# Patient Record
Sex: MALE | Race: WHITE | NOT HISPANIC OR LATINO | Employment: PART TIME | URBAN - METROPOLITAN AREA
[De-identification: names, ages, dates, MRNs, and addresses within clinical notes are randomized per-mention and may not be internally consistent; named-entity substitution may affect disease eponyms.]

---

## 2017-04-12 ENCOUNTER — OFFICE VISIT (OUTPATIENT)
Dept: GASTROENTEROLOGY | Facility: CLINIC | Age: 42
End: 2017-04-12

## 2017-04-12 VITALS
BODY MASS INDEX: 34.16 KG/M2 | HEIGHT: 70 IN | WEIGHT: 238.6 LBS | DIASTOLIC BLOOD PRESSURE: 76 MMHG | SYSTOLIC BLOOD PRESSURE: 128 MMHG

## 2017-04-12 DIAGNOSIS — R10.11 RIGHT UPPER QUADRANT ABDOMINAL PAIN: ICD-10-CM

## 2017-04-12 DIAGNOSIS — K62.5 RECTAL BLEEDING: ICD-10-CM

## 2017-04-12 DIAGNOSIS — D50.9 IRON DEFICIENCY ANEMIA, UNSPECIFIED IRON DEFICIENCY ANEMIA TYPE: Primary | ICD-10-CM

## 2017-04-12 DIAGNOSIS — R10.30 LOWER ABDOMINAL PAIN: ICD-10-CM

## 2017-04-12 PROCEDURE — 99203 OFFICE O/P NEW LOW 30 MIN: CPT | Performed by: INTERNAL MEDICINE

## 2017-04-12 RX ORDER — FLUTICASONE PROPIONATE 50 MCG
SPRAY, SUSPENSION (ML) NASAL
Refills: 1 | COMMUNITY
Start: 2017-03-06 | End: 2019-12-06

## 2017-04-12 RX ORDER — LISINOPRIL 10 MG/1
TABLET ORAL
Refills: 0 | COMMUNITY
Start: 2017-04-02 | End: 2023-03-03 | Stop reason: SDDI

## 2017-04-12 RX ORDER — SODIUM CHLORIDE 0.9 % (FLUSH) 0.9 %
1-10 SYRINGE (ML) INJECTION AS NEEDED
Status: CANCELLED | OUTPATIENT
Start: 2017-04-12

## 2017-04-12 RX ORDER — SUMATRIPTAN 100 MG/1
TABLET, FILM COATED ORAL
Refills: 0 | COMMUNITY
Start: 2017-03-06 | End: 2019-03-27

## 2017-04-12 NOTE — PROGRESS NOTES
"Chief Complaint   Patient presents with   • Anemia   • Rectal Bleeding   • Abdominal Pain        Emil Vo is a  41 y.o. male here for An initial visit for iron deficiency anemia, abdominal pain, rectal bleeding    HPI This 41-year-old white male patient of Dr. Hosea Manzano presents with recent history of iron deficiency anemia, rectal bleeding, and lower abdominal pain.  He recounts a history of a rectal fissure that was surgically addressed by Dr. Luke Morse in 2011.  Unfortunately, he has not had resolution of this process and notes significant bleeding on a regular basis with bowel movements.  He has used MiraLAX and stool softeners to reduce the bleeding but this has not completely resolved the problem.  He also complained of recent lower abdominal pain actually suprapubic in location with a sensation of being \"racked up\".  He did describe some blood in his semen and is scheduled to see a urologist in the near future.  He has been treated for this in the past with antibiotics and has responded appropriately.  He denies any reflux symptoms, has had intermittent right upper quadrant discomfort that was not associated with diet or bowel function.  This has subsided in the recent past.  Family history is negative for colorectal cancer.  He denies aspirin or NSAID use.  Weight has been stable.  Recent lab work reflects both iron deficiency and anemic.    Past Medical History:   Diagnosis Date   • Seasonal allergies        Current Outpatient Prescriptions   Medication Sig Dispense Refill   • BusPIRone HCl (BUSPAR PO) Take  by mouth.     • ciprofloxacin (CIPRO) 500 MG tablet Take 1 tablet by mouth 2 (two) times a day. 14 tablet 0   • DiphenhydrAMINE HCl (BENADRYL ALLERGY PO) Take  by mouth.     • fluticasone (FLONASE) 50 MCG/ACT nasal spray   1   • lisinopril (PRINIVIL,ZESTRIL) 10 MG tablet   0   • SUMAtriptan (IMITREX) 100 MG tablet   0   • Cyclobenzaprine HCl (FLEXERIL PO) Take  by mouth.       No current " facility-administered medications for this visit.        PRN Meds:.    Allergies   Allergen Reactions   • Penicillins        Social History     Social History   • Marital status: Single     Spouse name: N/A   • Number of children: N/A   • Years of education: N/A     Occupational History   • Not on file.     Social History Main Topics   • Smoking status: Never Smoker   • Smokeless tobacco: Not on file   • Alcohol use No   • Drug use: No   • Sexual activity: Not on file     Other Topics Concern   • Not on file     Social History Narrative       History reviewed. No pertinent family history.    Review of Systems   Constitutional: Negative for activity change, appetite change, fatigue and unexpected weight change.   HENT: Negative for congestion, facial swelling, sore throat, trouble swallowing and voice change.    Eyes: Negative for photophobia and visual disturbance.   Respiratory: Negative for cough and choking.    Cardiovascular: Negative for chest pain.   Gastrointestinal: Positive for anal bleeding. Negative for abdominal distention, abdominal pain, blood in stool, constipation, diarrhea, nausea, rectal pain and vomiting.   Endocrine: Negative for polyphagia.   Musculoskeletal: Negative for arthralgias, gait problem and joint swelling.   Skin: Negative for color change, pallor and rash.   Allergic/Immunologic: Negative for food allergies.   Neurological: Negative for speech difficulty and headaches.   Hematological: Does not bruise/bleed easily.   Psychiatric/Behavioral: Negative for agitation, confusion and sleep disturbance.       Vitals:    04/12/17 0814   BP: 128/76       Physical Exam   Constitutional: He is oriented to person, place, and time. He appears well-developed and well-nourished. No distress.   HENT:   Head: Normocephalic.   Mouth/Throat: Oropharynx is clear and moist. No oropharyngeal exudate.   Eyes: Conjunctivae and EOM are normal. No scleral icterus.   Neck: Normal range of motion. No  thyromegaly present.   Cardiovascular: Normal rate and regular rhythm.    No murmur heard.  Pulmonary/Chest: Breath sounds normal. No respiratory distress. He has no wheezes. He has no rales.   Abdominal: Soft. Bowel sounds are normal. He exhibits no distension and no mass. There is no hepatosplenomegaly. There is no tenderness.   Genitourinary:   Genitourinary Comments: Deferred examination until endoscopy   Musculoskeletal: Normal range of motion. He exhibits no edema or tenderness.   Lymphadenopathy:     He has no cervical adenopathy.   Neurological: He is alert and oriented to person, place, and time.   Skin: Skin is warm and dry. No rash noted. He is not diaphoretic. No erythema.   Psychiatric: He has a normal mood and affect. His behavior is normal.   Vitals reviewed.      ASSESSMENT  #1 iron deficiency anemia: May be associated with episodes of rectal bleeding cannot rule out other insidious blood loss.  #2 rectal bleeding: History of fissure surgery.  #3 lower abdominal pain: Seems to be associated with gynecologic source, urologic evaluation pending.      PLAN  Schedule EGD and colonoscopy      ICD-10-CM ICD-9-CM   1. Iron deficiency anemia, unspecified iron deficiency anemia type D50.9 280.9   2. Lower abdominal pain R10.30 789.09   3. Rectal bleeding K62.5 569.3

## 2017-05-10 ENCOUNTER — ANESTHESIA EVENT (OUTPATIENT)
Dept: GASTROENTEROLOGY | Facility: HOSPITAL | Age: 42
End: 2017-05-10

## 2017-05-10 ENCOUNTER — ANESTHESIA (OUTPATIENT)
Dept: GASTROENTEROLOGY | Facility: HOSPITAL | Age: 42
End: 2017-05-10

## 2017-05-10 ENCOUNTER — HOSPITAL ENCOUNTER (OUTPATIENT)
Facility: HOSPITAL | Age: 42
Setting detail: HOSPITAL OUTPATIENT SURGERY
Discharge: HOME | End: 2017-05-10
Attending: INTERNAL MEDICINE | Admitting: INTERNAL MEDICINE

## 2017-05-10 VITALS
SYSTOLIC BLOOD PRESSURE: 116 MMHG | RESPIRATION RATE: 16 BRPM | HEIGHT: 71 IN | BODY MASS INDEX: 32.68 KG/M2 | WEIGHT: 233.4 LBS | HEART RATE: 72 BPM | TEMPERATURE: 98.1 F | DIASTOLIC BLOOD PRESSURE: 77 MMHG | OXYGEN SATURATION: 97 %

## 2017-05-10 DIAGNOSIS — R10.11 RIGHT UPPER QUADRANT ABDOMINAL PAIN: ICD-10-CM

## 2017-05-10 DIAGNOSIS — R10.30 LOWER ABDOMINAL PAIN: ICD-10-CM

## 2017-05-10 DIAGNOSIS — K62.5 RECTAL BLEEDING: ICD-10-CM

## 2017-05-10 DIAGNOSIS — D50.9 IRON DEFICIENCY ANEMIA, UNSPECIFIED IRON DEFICIENCY ANEMIA TYPE: ICD-10-CM

## 2017-05-10 PROCEDURE — 87081 CULTURE SCREEN ONLY: CPT | Performed by: INTERNAL MEDICINE

## 2017-05-10 PROCEDURE — S0260 H&P FOR SURGERY: HCPCS | Performed by: INTERNAL MEDICINE

## 2017-05-10 PROCEDURE — 43239 EGD BIOPSY SINGLE/MULTIPLE: CPT | Performed by: INTERNAL MEDICINE

## 2017-05-10 PROCEDURE — 45378 DIAGNOSTIC COLONOSCOPY: CPT | Performed by: INTERNAL MEDICINE

## 2017-05-10 PROCEDURE — 88312 SPECIAL STAINS GROUP 1: CPT | Performed by: INTERNAL MEDICINE

## 2017-05-10 PROCEDURE — 25010000002 DIPHENHYDRAMINE PER 50 MG: Performed by: ANESTHESIOLOGY

## 2017-05-10 PROCEDURE — 25010000002 PROPOFOL 10 MG/ML EMULSION: Performed by: ANESTHESIOLOGY

## 2017-05-10 PROCEDURE — 88305 TISSUE EXAM BY PATHOLOGIST: CPT | Performed by: INTERNAL MEDICINE

## 2017-05-10 RX ORDER — LIDOCAINE HYDROCHLORIDE 20 MG/ML
INJECTION, SOLUTION INFILTRATION; PERINEURAL AS NEEDED
Status: DISCONTINUED | OUTPATIENT
Start: 2017-05-10 | End: 2017-05-10 | Stop reason: SURG

## 2017-05-10 RX ORDER — PROPOFOL 10 MG/ML
VIAL (ML) INTRAVENOUS AS NEEDED
Status: DISCONTINUED | OUTPATIENT
Start: 2017-05-10 | End: 2017-05-10 | Stop reason: SURG

## 2017-05-10 RX ORDER — SODIUM CHLORIDE, SODIUM LACTATE, POTASSIUM CHLORIDE, CALCIUM CHLORIDE 600; 310; 30; 20 MG/100ML; MG/100ML; MG/100ML; MG/100ML
20 INJECTION, SOLUTION INTRAVENOUS CONTINUOUS
Status: DISCONTINUED | OUTPATIENT
Start: 2017-05-10 | End: 2017-05-10 | Stop reason: HOSPADM

## 2017-05-10 RX ORDER — ONDANSETRON 2 MG/ML
4 INJECTION INTRAMUSCULAR; INTRAVENOUS ONCE AS NEEDED
Status: DISCONTINUED | OUTPATIENT
Start: 2017-05-10 | End: 2017-05-10 | Stop reason: HOSPADM

## 2017-05-10 RX ORDER — SODIUM CHLORIDE 0.9 % (FLUSH) 0.9 %
1-10 SYRINGE (ML) INJECTION AS NEEDED
Status: DISCONTINUED | OUTPATIENT
Start: 2017-05-10 | End: 2017-05-10 | Stop reason: HOSPADM

## 2017-05-10 RX ORDER — HYDROCODONE BITARTRATE AND ACETAMINOPHEN 10; 325 MG/1; MG/1
1 TABLET ORAL EVERY 6 HOURS PRN
COMMUNITY
End: 2019-03-27

## 2017-05-10 RX ORDER — DIPHENHYDRAMINE HYDROCHLORIDE 50 MG/ML
INJECTION INTRAMUSCULAR; INTRAVENOUS AS NEEDED
Status: DISCONTINUED | OUTPATIENT
Start: 2017-05-10 | End: 2017-05-10 | Stop reason: SURG

## 2017-05-10 RX ADMIN — SODIUM CHLORIDE, POTASSIUM CHLORIDE, SODIUM LACTATE AND CALCIUM CHLORIDE 20 ML/HR: 600; 310; 30; 20 INJECTION, SOLUTION INTRAVENOUS at 14:15

## 2017-05-10 RX ADMIN — DIPHENHYDRAMINE HYDROCHLORIDE 25 MG: 50 INJECTION INTRAMUSCULAR; INTRAVENOUS at 14:53

## 2017-05-10 RX ADMIN — PROPOFOL 240 MG: 10 INJECTION, EMULSION INTRAVENOUS at 15:00

## 2017-05-10 RX ADMIN — PROPOFOL 200 MG: 10 INJECTION, EMULSION INTRAVENOUS at 14:55

## 2017-05-10 RX ADMIN — PROPOFOL 560 MG: 10 INJECTION, EMULSION INTRAVENOUS at 14:53

## 2017-05-10 RX ADMIN — LIDOCAINE HYDROCHLORIDE 100 MG: 20 INJECTION, SOLUTION INFILTRATION; PERINEURAL at 14:40

## 2017-05-11 LAB — UREASE TISS QL: NEGATIVE

## 2017-05-12 LAB
CYTO UR: NORMAL
LAB AP CASE REPORT: NORMAL
Lab: NORMAL
PATH REPORT.FINAL DX SPEC: NORMAL
PATH REPORT.GROSS SPEC: NORMAL

## 2017-05-25 ENCOUNTER — TELEPHONE (OUTPATIENT)
Dept: GASTROENTEROLOGY | Facility: CLINIC | Age: 42
End: 2017-05-25

## 2017-07-31 ENCOUNTER — HOSPITAL ENCOUNTER (OUTPATIENT)
Dept: GENERAL RADIOLOGY | Facility: HOSPITAL | Age: 42
Discharge: HOME | End: 2017-07-31
Attending: FAMILY MEDICINE | Admitting: FAMILY MEDICINE

## 2017-07-31 DIAGNOSIS — M25.569 KNEE PAIN: ICD-10-CM

## 2017-07-31 PROCEDURE — 73560 X-RAY EXAM OF KNEE 1 OR 2: CPT

## 2018-02-06 ENCOUNTER — HOSPITAL ENCOUNTER (EMERGENCY)
Facility: HOSPITAL | Age: 43
Discharge: HOME | End: 2018-02-06
Attending: EMERGENCY MEDICINE | Admitting: EMERGENCY MEDICINE

## 2018-02-06 VITALS
TEMPERATURE: 98.1 F | BODY MASS INDEX: 34.36 KG/M2 | WEIGHT: 240 LBS | DIASTOLIC BLOOD PRESSURE: 91 MMHG | SYSTOLIC BLOOD PRESSURE: 137 MMHG | HEIGHT: 70 IN | RESPIRATION RATE: 16 BRPM | HEART RATE: 71 BPM | OXYGEN SATURATION: 100 %

## 2018-02-06 DIAGNOSIS — Z79.899 POLYPHARMACY: Primary | ICD-10-CM

## 2018-02-06 DIAGNOSIS — S80.10XA CONTUSION OF LOWER EXTREMITY, UNSPECIFIED LATERALITY, INITIAL ENCOUNTER: ICD-10-CM

## 2018-02-06 DIAGNOSIS — R27.0 ATAXIA: ICD-10-CM

## 2018-02-06 PROCEDURE — 99283 EMERGENCY DEPT VISIT LOW MDM: CPT

## 2018-02-06 NOTE — ED NOTES
Yesterday pt reports he took 2mg Lorazepam in the morning. 3 hrs later he took 800mg Gabapentin and another 2mg Lorazepam during the day. Benadryl taken at night before bed. Denies any medications today. Pt states his girlfriend called EMS d/t falls and confusion.  Pt complains of pain on legs d/t falls. Scattered contusions throughout both legs and knees.     Teri Evangelista RN  02/06/18 [id number]

## 2018-02-06 NOTE — ED PROVIDER NOTES
EMERGENCY DEPARTMENT ENCOUNTER    CHIEF COMPLAINT  Chief Complaint: falling and confusion  History given by: Patient  History limited by: N/A  Room Number: 48/48  PMD: Truman Manzano MD      HPI:  Pt is a 42 y.o. male who presents via EMS complaining of falling and confusion onset yesterday.  Pt reports his girlfriend called the ambulance after the pt fell multiple times upon standing from bed.  Pt reports that his falls caused leg pain. Pt is concerned by the medications he took yesterday (2mg Adavan x2 and Gabapentin and Benadryl).   Pt denies smoking, drinking, and drug use.      Duration/Onset/Timing: several hours after taking multiple medicines  Location: neuro  Radiation: none  Quality: confusion  Intensity/Severity: moderate  Associated Symptoms: leg pain  Aggravating or Alleviating Factors: standing  Previous Episodes: none      PAST MEDICAL HISTORY  Active Ambulatory Problems     Diagnosis Date Noted   • No Active Ambulatory Problems     Resolved Ambulatory Problems     Diagnosis Date Noted   • No Resolved Ambulatory Problems     Past Medical History:   Diagnosis Date   • Anxiety    • Arthritis    • Blood in semen    • Colon polyp    • Hypertension    • Seasonal allergies        PAST SURGICAL HISTORY  Past Surgical History:   Procedure Laterality Date   • ANAL FISSURECTOMY     • COLONOSCOPY N/A 5/10/2017    Procedure: COLONOSCOPY TO CECUM & T.I. ;  Surgeon: Truman FRIEND MD;  Location: [facility] ENDOSCOPY;  Service:    • ENDOSCOPY  5/10/2017    Procedure: ESOPHAGOGASTRODUODENOSCOPY WITH COLD BIOPSIES;  Surgeon: Truman FRIEND MD;  Location: [facility] ENDOSCOPY;  Service:        FAMILY HISTORY  History reviewed. No pertinent family history.    SOCIAL HISTORY  Social History     Social History   • Marital status: Single     Spouse name: N/A   • Number of children: N/A   • Years of education: N/A     Occupational History   • Not on file.     Social History Main Topics   • Smoking status: Never  Smoker   • Smokeless tobacco: Not on file   • Alcohol use No   • Drug use: No   • Sexual activity: Not on file     Other Topics Concern   • Not on file     Social History Narrative   • No narrative on file       ALLERGIES  Penicillins    REVIEW OF SYSTEMS  Review of Systems   Constitutional: Negative for fever.   Respiratory: Negative for shortness of breath.    Cardiovascular: Negative for chest pain.   Musculoskeletal: Positive for arthralgias (leg pain from falling).       PHYSICAL EXAM  ED Triage Vitals   Temp Heart Rate Resp BP SpO2   02/06/18 0700 02/06/18 0656 02/06/18 0656 02/06/18 0656 02/06/18 0656   98.1 °F (36.7 °C) 78 16 143/84 97 %      Temp src Heart Rate Source Patient Position BP Location FiO2 (%)   02/06/18 0700 -- -- -- --   Tympanic           Physical Exam   Constitutional: No distress.   HENT:   Head: Normocephalic and atraumatic.   Eyes: EOM are normal.   Neck: Normal range of motion.   Cardiovascular: Normal heart sounds.    Pulmonary/Chest: No respiratory distress.   Abdominal: There is no tenderness.   Musculoskeletal: He exhibits no edema.   Neurological: He is alert. Gait normal.   Skin: Skin is warm and dry.   Multiple contusions and abrasions to bilateral shins   Nursing note and vitals reviewed.        PROCEDURES  Procedures      PROGRESS AND CONSULTS  ED Course       1134  Discussed with pt his sx are caused by the combination of medications he took yesterday.  Discussed with pt plan of discharge and advised the pt to rest and avoid mixing those medications in the future.  Pt understands and agrees with the plan, all questions answered.      MEDICAL DECISION MAKING  Results were reviewed/discussed with the patient and they were also made aware of online access. Pt also made aware that some labs, such as cultures, will not be resulted during ER visit and follow up with PMD is necessary.     MDM  Number of Diagnoses or Management Options  Ataxia:   Contusion of lower extremity,  unspecified laterality, initial encounter:   Polypharmacy:   Patient Progress  Patient progress: stable         DIAGNOSIS  Final diagnoses:   Polypharmacy   Ataxia   Contusion of lower extremity, unspecified laterality, initial encounter       DISPOSITION  DISCHARGE    Patient discharged in stable condition.    Reviewed implications of results, diagnosis, meds, responsibility to follow up, warning signs and symptoms of possible worsening, potential complications and reasons to return to ER.    Patient/Family voiced understanding of above instructions.    Discussed plan for discharge, as there is no emergent indication for admission.  Pt/family is agreeable and understands need for follow up and repeat testing.  Pt is aware that discharge does not mean that nothing is wrong but it indicates no emergency is present that requires admission and they must continue care with follow-up as given below or physician of their choice.     FOLLOW-UP  Truman Manzano MD  [address] [facility] [address] [phone]      Call for Appointment         Medication List      Stop          ciprofloxacin 500 MG tablet   Commonly known as:  CIPRO       fluticasone 50 MCG/ACT nasal spray   Commonly known as:  FLONASE       HYDROcodone-acetaminophen  MG per tablet   Commonly known as:  NORCO               Latest Documented Vital Signs:  As of 11:35 AM  BP- 137/91 HR- 71 Temp- 98.1 °F (36.7 °C) (Tympanic) O2 sat- 100%    --  Documentation assistance provided by jj Bond for Dr. Wagner.  Information recorded by the scribe was done at my direction and has been verified and validated by me.     Isela Bond  02/06/18 8445       Pk Wagner MD  02/06/18 1130

## 2018-02-06 NOTE — ED TRIAGE NOTES
"EMS dispatched for insomnia and lack of coordination.  Pt reports he's had insomnia for the last 6-7hrs, when he does fall asleep pt states, \"i fall asleep and then wake up and run into things.\"  Pt also informed this RN he has taken prescribed lorazepam 1mg earlier in the day, then gabapentin 800mg early this morning.  Pt awake and alert upon assessment.  "

## 2018-06-25 ENCOUNTER — TRANSCRIBE ORDERS (OUTPATIENT)
Dept: ADMINISTRATIVE | Facility: HOSPITAL | Age: 43
End: 2018-06-25

## 2018-06-25 DIAGNOSIS — R63.4 WEIGHT LOSS: ICD-10-CM

## 2018-06-25 DIAGNOSIS — R53.83 FATIGUE, UNSPECIFIED TYPE: Primary | ICD-10-CM

## 2018-06-25 DIAGNOSIS — R10.9 ABDOMINAL PAIN, UNSPECIFIED ABDOMINAL LOCATION: ICD-10-CM

## 2018-06-25 DIAGNOSIS — R63.4 WEIGHT LOSS: Primary | ICD-10-CM

## 2021-06-23 PROCEDURE — 99213 OFFICE O/P EST LOW 20 MIN: CPT | Performed by: PHYSICIAN ASSISTANT

## 2021-08-26 PROCEDURE — 99213 OFFICE O/P EST LOW 20 MIN: CPT | Performed by: PHYSICIAN ASSISTANT

## 2021-11-01 PROCEDURE — 99213 OFFICE O/P EST LOW 20 MIN: CPT | Performed by: PHYSICIAN ASSISTANT

## 2023-02-17 ENCOUNTER — APPOINTMENT (OUTPATIENT)
Dept: ULTRASOUND IMAGING | Facility: HOSPITAL | Age: 48
End: 2023-02-17
Payer: COMMERCIAL

## 2023-02-17 ENCOUNTER — HOSPITAL ENCOUNTER (OUTPATIENT)
Facility: HOSPITAL | Age: 48
Discharge: HOME | End: 2023-02-19
Attending: EMERGENCY MEDICINE | Admitting: HOSPITALIST
Payer: COMMERCIAL

## 2023-02-17 DIAGNOSIS — K80.10 CALCULUS OF GALLBLADDER WITH CHRONIC CHOLECYSTITIS WITHOUT OBSTRUCTION: ICD-10-CM

## 2023-02-17 DIAGNOSIS — D50.9 MICROCYTIC ANEMIA: ICD-10-CM

## 2023-02-17 DIAGNOSIS — K81.9 CHOLECYSTITIS: Primary | ICD-10-CM

## 2023-02-17 PROBLEM — G43.909 MIGRAINE: Status: ACTIVE | Noted: 2023-02-17

## 2023-02-17 PROBLEM — I10 ESSENTIAL HYPERTENSION: Status: ACTIVE | Noted: 2023-02-17

## 2023-02-17 PROBLEM — J30.9 ALLERGIC RHINITIS: Status: ACTIVE | Noted: 2023-02-17

## 2023-02-17 PROBLEM — K80.20 CHOLELITHIASIS: Status: ACTIVE | Noted: 2023-02-17

## 2023-02-17 PROBLEM — D64.9 ANEMIA: Status: ACTIVE | Noted: 2023-02-17

## 2023-02-17 PROBLEM — F41.1 GENERALIZED ANXIETY DISORDER: Status: ACTIVE | Noted: 2023-02-17

## 2023-02-17 LAB
ALBUMIN SERPL-MCNC: 4.7 G/DL (ref 3.5–5.2)
ALBUMIN/GLOB SERPL: 1.7 G/DL
ALP SERPL-CCNC: 110 U/L (ref 39–117)
ALT SERPL W P-5'-P-CCNC: 16 U/L (ref 1–41)
ANION GAP SERPL CALCULATED.3IONS-SCNC: 9 MMOL/L (ref 5–15)
ANISOCYTOSIS BLD QL: ABNORMAL
AST SERPL-CCNC: 15 U/L (ref 1–40)
BASOPHILS # BLD MANUAL: 0.12 10*3/MM3 (ref 0–0.2)
BASOPHILS NFR BLD MANUAL: 2 % (ref 0–1.5)
BILIRUB SERPL-MCNC: 0.4 MG/DL (ref 0–1.2)
BILIRUB UR QL STRIP: NEGATIVE
BUN SERPL-MCNC: 14 MG/DL (ref 6–20)
BUN/CREAT SERPL: 14.3 (ref 7–25)
CALCIUM SPEC-SCNC: 9.3 MG/DL (ref 8.6–10.5)
CHLORIDE SERPL-SCNC: 102 MMOL/L (ref 98–107)
CLARITY UR: CLEAR
CO2 SERPL-SCNC: 27 MMOL/L (ref 22–29)
COLOR UR: ABNORMAL
CREAT SERPL-MCNC: 0.98 MG/DL (ref 0.76–1.27)
D-LACTATE SERPL-SCNC: 1 MMOL/L (ref 0.5–2)
DEPRECATED RDW RBC AUTO: 36.7 FL (ref 37–54)
EGFRCR SERPLBLD CKD-EPI 2021: 96.3 ML/MIN/1.73
EOSINOPHIL # BLD MANUAL: 0.06 10*3/MM3 (ref 0–0.4)
EOSINOPHIL NFR BLD MANUAL: 1 % (ref 0.3–6.2)
ERYTHROCYTE [DISTWIDTH] IN BLOOD BY AUTOMATED COUNT: 16.6 % (ref 12.3–15.4)
GLOBULIN UR ELPH-MCNC: 2.8 GM/DL
GLUCOSE SERPL-MCNC: 117 MG/DL (ref 65–99)
GLUCOSE UR STRIP-MCNC: NEGATIVE MG/DL
HCT VFR BLD AUTO: 32.4 % (ref 37.5–51)
HGB BLD-MCNC: 9.3 G/DL (ref 13–17.7)
HGB UR QL STRIP.AUTO: NEGATIVE
HOLD SPECIMEN: NORMAL
HOLD SPECIMEN: NORMAL
HYPOCHROMIA BLD QL: ABNORMAL
KETONES UR QL STRIP: ABNORMAL
LEUKOCYTE ESTERASE UR QL STRIP.AUTO: NEGATIVE
LIPASE SERPL-CCNC: 7 U/L (ref 13–60)
LYMPHOCYTES # BLD MANUAL: 0.56 10*3/MM3 (ref 0.7–3.1)
LYMPHOCYTES NFR BLD MANUAL: 5.1 % (ref 5–12)
MCH RBC QN AUTO: 18.1 PG (ref 26.6–33)
MCHC RBC AUTO-ENTMCNC: 28.7 G/DL (ref 31.5–35.7)
MCV RBC AUTO: 63.2 FL (ref 79–97)
MICROCYTES BLD QL: ABNORMAL
MONOCYTES # BLD: 0.31 10*3/MM3 (ref 0.1–0.9)
NEUTROPHILS # BLD AUTO: 5.08 10*3/MM3 (ref 1.7–7)
NEUTROPHILS NFR BLD MANUAL: 82.8 % (ref 42.7–76)
NITRITE UR QL STRIP: NEGATIVE
NRBC BLD AUTO-RTO: 0 /100 WBC (ref 0–0.2)
OVALOCYTES BLD QL SMEAR: ABNORMAL
PH UR STRIP.AUTO: 5.5 [PH] (ref 5–8)
PLAT MORPH BLD: NORMAL
PLATELET # BLD AUTO: 319 10*3/MM3 (ref 140–450)
PMV BLD AUTO: 9.8 FL (ref 6–12)
POIKILOCYTOSIS BLD QL SMEAR: ABNORMAL
POLYCHROMASIA BLD QL SMEAR: ABNORMAL
POTASSIUM SERPL-SCNC: 3.5 MMOL/L (ref 3.5–5.2)
PROT SERPL-MCNC: 7.5 G/DL (ref 6–8.5)
PROT UR QL STRIP: ABNORMAL
RBC # BLD AUTO: 5.13 10*6/MM3 (ref 4.14–5.8)
SODIUM SERPL-SCNC: 138 MMOL/L (ref 136–145)
SP GR UR STRIP: >=1.03 (ref 1–1.03)
UROBILINOGEN UR QL STRIP: ABNORMAL
VARIANT LYMPHS NFR BLD MANUAL: 9.1 % (ref 19.6–45.3)
WBC MORPH BLD: NORMAL
WBC NRBC COR # BLD: 6.13 10*3/MM3 (ref 3.4–10.8)
WHOLE BLOOD HOLD COAG: NORMAL
WHOLE BLOOD HOLD SPECIMEN: NORMAL

## 2023-02-17 PROCEDURE — 87040 BLOOD CULTURE FOR BACTERIA: CPT | Performed by: EMERGENCY MEDICINE

## 2023-02-17 PROCEDURE — 84466 ASSAY OF TRANSFERRIN: CPT | Performed by: INTERNAL MEDICINE

## 2023-02-17 PROCEDURE — 36415 COLL VENOUS BLD VENIPUNCTURE: CPT

## 2023-02-17 PROCEDURE — 76705 ECHO EXAM OF ABDOMEN: CPT

## 2023-02-17 PROCEDURE — G0378 HOSPITAL OBSERVATION PER HR: HCPCS

## 2023-02-17 PROCEDURE — 25010000002 CEFTRIAXONE PER 250 MG: Performed by: EMERGENCY MEDICINE

## 2023-02-17 PROCEDURE — 83010 ASSAY OF HAPTOGLOBIN QUANT: CPT | Performed by: INTERNAL MEDICINE

## 2023-02-17 PROCEDURE — 80053 COMPREHEN METABOLIC PANEL: CPT | Performed by: EMERGENCY MEDICINE

## 2023-02-17 PROCEDURE — 85025 COMPLETE CBC W/AUTO DIFF WBC: CPT

## 2023-02-17 PROCEDURE — 93005 ELECTROCARDIOGRAM TRACING: CPT | Performed by: INTERNAL MEDICINE

## 2023-02-17 PROCEDURE — 81003 URINALYSIS AUTO W/O SCOPE: CPT | Performed by: EMERGENCY MEDICINE

## 2023-02-17 PROCEDURE — 83540 ASSAY OF IRON: CPT | Performed by: INTERNAL MEDICINE

## 2023-02-17 PROCEDURE — 82607 VITAMIN B-12: CPT | Performed by: INTERNAL MEDICINE

## 2023-02-17 PROCEDURE — 83690 ASSAY OF LIPASE: CPT | Performed by: EMERGENCY MEDICINE

## 2023-02-17 PROCEDURE — 99284 EMERGENCY DEPT VISIT MOD MDM: CPT

## 2023-02-17 PROCEDURE — 83615 LACTATE (LD) (LDH) ENZYME: CPT | Performed by: INTERNAL MEDICINE

## 2023-02-17 PROCEDURE — 83605 ASSAY OF LACTIC ACID: CPT | Performed by: EMERGENCY MEDICINE

## 2023-02-17 PROCEDURE — 93010 ELECTROCARDIOGRAM REPORT: CPT | Performed by: INTERNAL MEDICINE

## 2023-02-17 PROCEDURE — 82746 ASSAY OF FOLIC ACID SERUM: CPT | Performed by: INTERNAL MEDICINE

## 2023-02-17 PROCEDURE — 85007 BL SMEAR W/DIFF WBC COUNT: CPT | Performed by: EMERGENCY MEDICINE

## 2023-02-17 PROCEDURE — 82728 ASSAY OF FERRITIN: CPT | Performed by: INTERNAL MEDICINE

## 2023-02-17 RX ORDER — FLUTICASONE PROPIONATE 50 MCG
2 SPRAY, SUSPENSION (ML) NASAL DAILY
Status: DISCONTINUED | OUTPATIENT
Start: 2023-02-18 | End: 2023-02-19 | Stop reason: HOSPADM

## 2023-02-17 RX ORDER — CHOLECALCIFEROL (VITAMIN D3) 125 MCG
5 CAPSULE ORAL NIGHTLY PRN
Status: DISCONTINUED | OUTPATIENT
Start: 2023-02-17 | End: 2023-02-19 | Stop reason: HOSPADM

## 2023-02-17 RX ORDER — LISINOPRIL 10 MG/1
10 TABLET ORAL
Status: DISCONTINUED | OUTPATIENT
Start: 2023-02-18 | End: 2023-02-19 | Stop reason: HOSPADM

## 2023-02-17 RX ORDER — SUMATRIPTAN 50 MG/1
50 TABLET, FILM COATED ORAL
Status: DISCONTINUED | OUTPATIENT
Start: 2023-02-17 | End: 2023-02-19 | Stop reason: HOSPADM

## 2023-02-17 RX ORDER — ACETAMINOPHEN 160 MG/5ML
650 SOLUTION ORAL EVERY 4 HOURS PRN
Status: DISCONTINUED | OUTPATIENT
Start: 2023-02-17 | End: 2023-02-19 | Stop reason: HOSPADM

## 2023-02-17 RX ORDER — SODIUM CHLORIDE 9 MG/ML
40 INJECTION, SOLUTION INTRAVENOUS AS NEEDED
Status: DISCONTINUED | OUTPATIENT
Start: 2023-02-17 | End: 2023-02-19 | Stop reason: HOSPADM

## 2023-02-17 RX ORDER — ACETAMINOPHEN 650 MG/1
650 SUPPOSITORY RECTAL EVERY 4 HOURS PRN
Status: DISCONTINUED | OUTPATIENT
Start: 2023-02-17 | End: 2023-02-19 | Stop reason: HOSPADM

## 2023-02-17 RX ORDER — ACETAMINOPHEN 325 MG/1
650 TABLET ORAL EVERY 4 HOURS PRN
Status: DISCONTINUED | OUTPATIENT
Start: 2023-02-17 | End: 2023-02-19 | Stop reason: HOSPADM

## 2023-02-17 RX ORDER — SODIUM CHLORIDE 0.9 % (FLUSH) 0.9 %
10 SYRINGE (ML) INJECTION EVERY 12 HOURS SCHEDULED
Status: DISCONTINUED | OUTPATIENT
Start: 2023-02-17 | End: 2023-02-19 | Stop reason: HOSPADM

## 2023-02-17 RX ORDER — SODIUM CHLORIDE 9 MG/ML
100 INJECTION, SOLUTION INTRAVENOUS CONTINUOUS
Status: DISCONTINUED | OUTPATIENT
Start: 2023-02-17 | End: 2023-02-19 | Stop reason: HOSPADM

## 2023-02-17 RX ORDER — ONDANSETRON 2 MG/ML
4 INJECTION INTRAMUSCULAR; INTRAVENOUS EVERY 6 HOURS PRN
Status: DISCONTINUED | OUTPATIENT
Start: 2023-02-17 | End: 2023-02-19 | Stop reason: HOSPADM

## 2023-02-17 RX ORDER — CETIRIZINE HYDROCHLORIDE 10 MG/1
10 TABLET ORAL DAILY
Status: DISCONTINUED | OUTPATIENT
Start: 2023-02-18 | End: 2023-02-19 | Stop reason: HOSPADM

## 2023-02-17 RX ORDER — BISACODYL 10 MG
10 SUPPOSITORY, RECTAL RECTAL DAILY PRN
Status: DISCONTINUED | OUTPATIENT
Start: 2023-02-17 | End: 2023-02-19 | Stop reason: HOSPADM

## 2023-02-17 RX ORDER — POLYETHYLENE GLYCOL 3350 17 G/17G
17 POWDER, FOR SOLUTION ORAL DAILY PRN
Status: DISCONTINUED | OUTPATIENT
Start: 2023-02-17 | End: 2023-02-19 | Stop reason: HOSPADM

## 2023-02-17 RX ORDER — LEVOFLOXACIN 5 MG/ML
750 INJECTION, SOLUTION INTRAVENOUS EVERY 24 HOURS
Status: DISCONTINUED | OUTPATIENT
Start: 2023-02-18 | End: 2023-02-19 | Stop reason: HOSPADM

## 2023-02-17 RX ORDER — SODIUM CHLORIDE 0.9 % (FLUSH) 0.9 %
10 SYRINGE (ML) INJECTION AS NEEDED
Status: DISCONTINUED | OUTPATIENT
Start: 2023-02-17 | End: 2023-02-19 | Stop reason: HOSPADM

## 2023-02-17 RX ORDER — NALOXONE HCL 0.4 MG/ML
0.4 VIAL (ML) INJECTION
Status: DISCONTINUED | OUTPATIENT
Start: 2023-02-17 | End: 2023-02-19 | Stop reason: HOSPADM

## 2023-02-17 RX ORDER — METRONIDAZOLE 500 MG/100ML
500 INJECTION, SOLUTION INTRAVENOUS EVERY 8 HOURS
Status: DISCONTINUED | OUTPATIENT
Start: 2023-02-18 | End: 2023-02-19 | Stop reason: HOSPADM

## 2023-02-17 RX ORDER — METRONIDAZOLE 500 MG/100ML
500 INJECTION, SOLUTION INTRAVENOUS ONCE
Status: COMPLETED | OUTPATIENT
Start: 2023-02-17 | End: 2023-02-17

## 2023-02-17 RX ORDER — HYDROCODONE BITARTRATE AND ACETAMINOPHEN 7.5; 325 MG/1; MG/1
1 TABLET ORAL EVERY 4 HOURS PRN
Status: DISCONTINUED | OUTPATIENT
Start: 2023-02-17 | End: 2023-02-19 | Stop reason: HOSPADM

## 2023-02-17 RX ORDER — FAMOTIDINE 10 MG/ML
20 INJECTION, SOLUTION INTRAVENOUS EVERY 12 HOURS SCHEDULED
Status: DISCONTINUED | OUTPATIENT
Start: 2023-02-17 | End: 2023-02-19 | Stop reason: HOSPADM

## 2023-02-17 RX ORDER — BISACODYL 5 MG/1
5 TABLET, DELAYED RELEASE ORAL DAILY PRN
Status: DISCONTINUED | OUTPATIENT
Start: 2023-02-17 | End: 2023-02-19 | Stop reason: HOSPADM

## 2023-02-17 RX ORDER — HYDROMORPHONE HYDROCHLORIDE 1 MG/ML
0.5 INJECTION, SOLUTION INTRAMUSCULAR; INTRAVENOUS; SUBCUTANEOUS
Status: DISCONTINUED | OUTPATIENT
Start: 2023-02-17 | End: 2023-02-18

## 2023-02-17 RX ORDER — ONDANSETRON 4 MG/1
4 TABLET, FILM COATED ORAL EVERY 6 HOURS PRN
Status: DISCONTINUED | OUTPATIENT
Start: 2023-02-17 | End: 2023-02-19 | Stop reason: HOSPADM

## 2023-02-17 RX ORDER — HYDROXYZINE HYDROCHLORIDE 25 MG/1
25 TABLET, FILM COATED ORAL 2 TIMES DAILY PRN
Status: DISCONTINUED | OUTPATIENT
Start: 2023-02-17 | End: 2023-02-19 | Stop reason: HOSPADM

## 2023-02-17 RX ORDER — AMOXICILLIN 250 MG
2 CAPSULE ORAL 2 TIMES DAILY
Status: DISCONTINUED | OUTPATIENT
Start: 2023-02-17 | End: 2023-02-19 | Stop reason: HOSPADM

## 2023-02-17 RX ORDER — LORAZEPAM 0.5 MG/1
0.5 TABLET ORAL EVERY 8 HOURS PRN
Status: DISCONTINUED | OUTPATIENT
Start: 2023-02-17 | End: 2023-02-19 | Stop reason: HOSPADM

## 2023-02-17 RX ADMIN — SODIUM CHLORIDE 100 ML/HR: 9 INJECTION, SOLUTION INTRAVENOUS at 23:06

## 2023-02-17 RX ADMIN — CEFTRIAXONE SODIUM 1 G: 1 INJECTION, POWDER, FOR SOLUTION INTRAMUSCULAR; INTRAVENOUS at 23:16

## 2023-02-17 RX ADMIN — METRONIDAZOLE 500 MG: 500 INJECTION, SOLUTION INTRAVENOUS at 22:11

## 2023-02-17 RX ADMIN — ACETAMINOPHEN 650 MG: 325 TABLET, FILM COATED ORAL at 23:21

## 2023-02-17 RX ADMIN — DOCUSATE SODIUM 50MG AND SENNOSIDES 8.6MG 2 TABLET: 8.6; 5 TABLET, FILM COATED ORAL at 23:15

## 2023-02-17 NOTE — ED TRIAGE NOTES
Upper stomach pains after eating, patient states that he has nausea with this. He has felt bad for about 2 weeks now. Patient denies diarrhea.

## 2023-02-17 NOTE — ED TRIAGE NOTES
Pt reports RUQ abd pain x 2 weeks.  He has been nauseated.  No vd.  He has been unable to eat without increased pain    Patient was placed in face mask during first look triage.  Patient was wearing a face mask throughout encounter.  I wore personal protective equipment throughout the encounter.  Hand hygiene was performed before and after patient encounter.

## 2023-02-18 ENCOUNTER — ANESTHESIA EVENT (OUTPATIENT)
Dept: PERIOP | Facility: HOSPITAL | Age: 48
End: 2023-02-18
Payer: COMMERCIAL

## 2023-02-18 ENCOUNTER — ANESTHESIA (OUTPATIENT)
Dept: PERIOP | Facility: HOSPITAL | Age: 48
End: 2023-02-18
Payer: COMMERCIAL

## 2023-02-18 ENCOUNTER — ON CAMPUS - OUTPATIENT (OUTPATIENT)
Dept: URBAN - METROPOLITAN AREA HOSPITAL 114 | Facility: HOSPITAL | Age: 48
End: 2023-02-18

## 2023-02-18 ENCOUNTER — APPOINTMENT (OUTPATIENT)
Dept: GENERAL RADIOLOGY | Facility: HOSPITAL | Age: 48
End: 2023-02-18
Payer: COMMERCIAL

## 2023-02-18 DIAGNOSIS — K80.50 CALCULUS OF BILE DUCT WITHOUT CHOLANGITIS OR CHOLECYSTITIS W: ICD-10-CM

## 2023-02-18 DIAGNOSIS — R10.9 UNSPECIFIED ABDOMINAL PAIN: ICD-10-CM

## 2023-02-18 DIAGNOSIS — D50.0 IRON DEFICIENCY ANEMIA SECONDARY TO BLOOD LOSS (CHRONIC): ICD-10-CM

## 2023-02-18 LAB
ABO GROUP BLD: NORMAL
ALBUMIN SERPL-MCNC: 4 G/DL (ref 3.5–5.2)
ALBUMIN/GLOB SERPL: 1.4 G/DL
ALP SERPL-CCNC: 87 U/L (ref 39–117)
ALT SERPL W P-5'-P-CCNC: 16 U/L (ref 1–41)
ANION GAP SERPL CALCULATED.3IONS-SCNC: 10.4 MMOL/L (ref 5–15)
AST SERPL-CCNC: 13 U/L (ref 1–40)
BASOPHILS # BLD AUTO: 0.06 10*3/MM3 (ref 0–0.2)
BASOPHILS NFR BLD AUTO: 0.9 % (ref 0–1.5)
BILIRUB SERPL-MCNC: 0.4 MG/DL (ref 0–1.2)
BLD GP AB SCN SERPL QL: NEGATIVE
BUN SERPL-MCNC: 14 MG/DL (ref 6–20)
BUN/CREAT SERPL: 18.4 (ref 7–25)
CALCIUM SPEC-SCNC: 8.5 MG/DL (ref 8.6–10.5)
CHLORIDE SERPL-SCNC: 103 MMOL/L (ref 98–107)
CO2 SERPL-SCNC: 25.6 MMOL/L (ref 22–29)
CREAT SERPL-MCNC: 0.76 MG/DL (ref 0.76–1.27)
DEPRECATED RDW RBC AUTO: 38 FL (ref 37–54)
EGFRCR SERPLBLD CKD-EPI 2021: 112.3 ML/MIN/1.73
EOSINOPHIL # BLD AUTO: 0.14 10*3/MM3 (ref 0–0.4)
EOSINOPHIL NFR BLD AUTO: 2.2 % (ref 0.3–6.2)
ERYTHROCYTE [DISTWIDTH] IN BLOOD BY AUTOMATED COUNT: 16.9 % (ref 12.3–15.4)
FERRITIN SERPL-MCNC: 4.24 NG/ML (ref 30–400)
FOLATE SERPL-MCNC: 12.5 NG/ML (ref 4.78–24.2)
GLOBULIN UR ELPH-MCNC: 2.8 GM/DL
GLUCOSE SERPL-MCNC: 109 MG/DL (ref 65–99)
HAPTOGLOB SERPL-MCNC: 145 MG/DL (ref 30–200)
HBA1C MFR BLD: 5.3 % (ref 4.8–5.6)
HCT VFR BLD AUTO: 29.3 % (ref 37.5–51)
HGB BLD-MCNC: 8.2 G/DL (ref 13–17.7)
IRON 24H UR-MRATE: 18 MCG/DL (ref 59–158)
IRON SATN MFR SERPL: 3 % (ref 20–50)
LDH SERPL-CCNC: 146 U/L (ref 135–225)
LYMPHOCYTES # BLD AUTO: 2.12 10*3/MM3 (ref 0.7–3.1)
LYMPHOCYTES NFR BLD AUTO: 33.5 % (ref 19.6–45.3)
MCH RBC QN AUTO: 18.1 PG (ref 26.6–33)
MCHC RBC AUTO-ENTMCNC: 28 G/DL (ref 31.5–35.7)
MCV RBC AUTO: 64.7 FL (ref 79–97)
MONOCYTES # BLD AUTO: 0.74 10*3/MM3 (ref 0.1–0.9)
MONOCYTES NFR BLD AUTO: 11.7 % (ref 5–12)
NEUTROPHILS NFR BLD AUTO: 3.25 10*3/MM3 (ref 1.7–7)
NEUTROPHILS NFR BLD AUTO: 51.4 % (ref 42.7–76)
PLATELET # BLD AUTO: 270 10*3/MM3 (ref 140–450)
PMV BLD AUTO: 10.2 FL (ref 6–12)
POTASSIUM SERPL-SCNC: 4.1 MMOL/L (ref 3.5–5.2)
PROT SERPL-MCNC: 6.8 G/DL (ref 6–8.5)
QT INTERVAL: 426 MS
RBC # BLD AUTO: 4.53 10*6/MM3 (ref 4.14–5.8)
RETICS # AUTO: 0.04 10*6/MM3 (ref 0.02–0.13)
RETICS/RBC NFR AUTO: 0.9 % (ref 0.7–1.9)
RH BLD: POSITIVE
SODIUM SERPL-SCNC: 139 MMOL/L (ref 136–145)
T&S EXPIRATION DATE: NORMAL
TIBC SERPL-MCNC: 587 MCG/DL (ref 298–536)
TRANSFERRIN SERPL-MCNC: 394 MG/DL (ref 200–360)
VIT B12 BLD-MCNC: 403 PG/ML (ref 211–946)
WBC NRBC COR # BLD: 6.33 10*3/MM3 (ref 3.4–10.8)

## 2023-02-18 PROCEDURE — C1758 CATHETER, URETERAL: HCPCS | Performed by: SURGERY

## 2023-02-18 PROCEDURE — 85045 AUTOMATED RETICULOCYTE COUNT: CPT | Performed by: INTERNAL MEDICINE

## 2023-02-18 PROCEDURE — 25010000002 MIDAZOLAM PER 1 MG: Performed by: ANESTHESIOLOGY

## 2023-02-18 PROCEDURE — 99203 OFFICE O/P NEW LOW 30 MIN: CPT | Performed by: NURSE PRACTITIONER

## 2023-02-18 PROCEDURE — 25010000002 HYDROMORPHONE PER 4 MG: Performed by: NURSE ANESTHETIST, CERTIFIED REGISTERED

## 2023-02-18 PROCEDURE — 86900 BLOOD TYPING SEROLOGIC ABO: CPT | Performed by: ANESTHESIOLOGY

## 2023-02-18 PROCEDURE — 86901 BLOOD TYPING SEROLOGIC RH(D): CPT | Performed by: ANESTHESIOLOGY

## 2023-02-18 PROCEDURE — 96376 TX/PRO/DX INJ SAME DRUG ADON: CPT

## 2023-02-18 PROCEDURE — 88304 TISSUE EXAM BY PATHOLOGIST: CPT | Performed by: SURGERY

## 2023-02-18 PROCEDURE — G0378 HOSPITAL OBSERVATION PER HR: HCPCS

## 2023-02-18 PROCEDURE — 99222 1ST HOSP IP/OBS MODERATE 55: CPT | Performed by: INTERNAL MEDICINE

## 2023-02-18 PROCEDURE — 25010000002 NEOSTIGMINE 5 MG/10ML SOLUTION: Performed by: NURSE ANESTHETIST, CERTIFIED REGISTERED

## 2023-02-18 PROCEDURE — 47563 LAPARO CHOLECYSTECTOMY/GRAPH: CPT | Performed by: SURGERY

## 2023-02-18 PROCEDURE — 25010000002 ONDANSETRON PER 1 MG: Performed by: SURGERY

## 2023-02-18 PROCEDURE — 25010000002 HYDROMORPHONE 1 MG/ML SOLUTION: Performed by: NURSE ANESTHETIST, CERTIFIED REGISTERED

## 2023-02-18 PROCEDURE — 25010000002 FENTANYL CITRATE (PF) 50 MCG/ML SOLUTION: Performed by: NURSE ANESTHETIST, CERTIFIED REGISTERED

## 2023-02-18 PROCEDURE — 83036 HEMOGLOBIN GLYCOSYLATED A1C: CPT | Performed by: INTERNAL MEDICINE

## 2023-02-18 PROCEDURE — 74300 X-RAY BILE DUCTS/PANCREAS: CPT

## 2023-02-18 PROCEDURE — 25010000002 DEXAMETHASONE SODIUM PHOSPHATE 20 MG/5ML SOLUTION: Performed by: NURSE ANESTHETIST, CERTIFIED REGISTERED

## 2023-02-18 PROCEDURE — 99204 OFFICE O/P NEW MOD 45 MIN: CPT | Performed by: SURGERY

## 2023-02-18 PROCEDURE — 85025 COMPLETE CBC W/AUTO DIFF WBC: CPT | Performed by: INTERNAL MEDICINE

## 2023-02-18 PROCEDURE — 80053 COMPREHEN METABOLIC PANEL: CPT | Performed by: INTERNAL MEDICINE

## 2023-02-18 PROCEDURE — 96365 THER/PROPH/DIAG IV INF INIT: CPT

## 2023-02-18 PROCEDURE — 96367 TX/PROPH/DG ADDL SEQ IV INF: CPT

## 2023-02-18 PROCEDURE — 25010000002 NA FERRIC GLUC CPLX PER 12.5 MG: Performed by: SURGERY

## 2023-02-18 PROCEDURE — 25010000002 LEVOFLOXACIN PER 250 MG: Performed by: INTERNAL MEDICINE

## 2023-02-18 PROCEDURE — 86850 RBC ANTIBODY SCREEN: CPT | Performed by: ANESTHESIOLOGY

## 2023-02-18 PROCEDURE — 25010000002 PROPOFOL 10 MG/ML EMULSION: Performed by: NURSE ANESTHETIST, CERTIFIED REGISTERED

## 2023-02-18 PROCEDURE — 25010000002 ONDANSETRON PER 1 MG: Performed by: INTERNAL MEDICINE

## 2023-02-18 PROCEDURE — 83020 HEMOGLOBIN ELECTROPHORESIS: CPT | Performed by: INTERNAL MEDICINE

## 2023-02-18 PROCEDURE — 25010000002 KETOROLAC TROMETHAMINE PER 15 MG: Performed by: HOSPITALIST

## 2023-02-18 PROCEDURE — 25510000001 IOPAMIDOL 61 % SOLUTION: Performed by: SURGERY

## 2023-02-18 PROCEDURE — 25010000002 DIPHENHYDRAMINE PER 50 MG: Performed by: SURGERY

## 2023-02-18 PROCEDURE — 96375 TX/PRO/DX INJ NEW DRUG ADDON: CPT

## 2023-02-18 PROCEDURE — 25010000002 ONDANSETRON PER 1 MG: Performed by: NURSE ANESTHETIST, CERTIFIED REGISTERED

## 2023-02-18 PROCEDURE — 25010000002 HYDROMORPHONE PER 4 MG: Performed by: SURGERY

## 2023-02-18 PROCEDURE — 47563 LAPARO CHOLECYSTECTOMY/GRAPH: CPT | Performed by: SPECIALIST/TECHNOLOGIST, OTHER

## 2023-02-18 DEVICE — MEDIUM-LARGE LIGATION CLIPS 6 CLIPS/CART
Type: IMPLANTABLE DEVICE | Site: ABDOMEN | Status: FUNCTIONAL
Brand: VAS-Q-CLIP

## 2023-02-18 RX ORDER — SODIUM CHLORIDE 0.9 % (FLUSH) 0.9 %
10 SYRINGE (ML) INJECTION AS NEEDED
Status: DISCONTINUED | OUTPATIENT
Start: 2023-02-18 | End: 2023-02-18 | Stop reason: HOSPADM

## 2023-02-18 RX ORDER — FLUMAZENIL 0.1 MG/ML
0.2 INJECTION INTRAVENOUS AS NEEDED
Status: DISCONTINUED | OUTPATIENT
Start: 2023-02-18 | End: 2023-02-18 | Stop reason: HOSPADM

## 2023-02-18 RX ORDER — ROCURONIUM BROMIDE 10 MG/ML
INJECTION, SOLUTION INTRAVENOUS AS NEEDED
Status: DISCONTINUED | OUTPATIENT
Start: 2023-02-18 | End: 2023-02-18 | Stop reason: SURG

## 2023-02-18 RX ORDER — DIPHENHYDRAMINE HYDROCHLORIDE 50 MG/ML
25 INJECTION INTRAMUSCULAR; INTRAVENOUS DAILY
Status: DISCONTINUED | OUTPATIENT
Start: 2023-02-18 | End: 2023-02-19 | Stop reason: HOSPADM

## 2023-02-18 RX ORDER — BUPIVACAINE HYDROCHLORIDE AND EPINEPHRINE 2.5; 5 MG/ML; UG/ML
INJECTION, SOLUTION EPIDURAL; INFILTRATION; INTRACAUDAL; PERINEURAL AS NEEDED
Status: DISCONTINUED | OUTPATIENT
Start: 2023-02-18 | End: 2023-02-18 | Stop reason: HOSPADM

## 2023-02-18 RX ORDER — ONDANSETRON 2 MG/ML
INJECTION INTRAMUSCULAR; INTRAVENOUS AS NEEDED
Status: DISCONTINUED | OUTPATIENT
Start: 2023-02-18 | End: 2023-02-18 | Stop reason: SURG

## 2023-02-18 RX ORDER — LIDOCAINE HYDROCHLORIDE 20 MG/ML
INJECTION, SOLUTION INFILTRATION; PERINEURAL AS NEEDED
Status: DISCONTINUED | OUTPATIENT
Start: 2023-02-18 | End: 2023-02-18 | Stop reason: SURG

## 2023-02-18 RX ORDER — KETAMINE HCL IN NACL, ISO-OSM 100MG/10ML
10 SYRINGE (ML) INJECTION AS NEEDED
Status: COMPLETED | OUTPATIENT
Start: 2023-02-18 | End: 2023-02-18

## 2023-02-18 RX ORDER — PROPOFOL 10 MG/ML
VIAL (ML) INTRAVENOUS AS NEEDED
Status: DISCONTINUED | OUTPATIENT
Start: 2023-02-18 | End: 2023-02-18 | Stop reason: SURG

## 2023-02-18 RX ORDER — SODIUM CHLORIDE 9 MG/ML
40 INJECTION, SOLUTION INTRAVENOUS AS NEEDED
Status: DISCONTINUED | OUTPATIENT
Start: 2023-02-18 | End: 2023-02-18 | Stop reason: HOSPADM

## 2023-02-18 RX ORDER — DEXAMETHASONE SODIUM PHOSPHATE 4 MG/ML
INJECTION, SOLUTION INTRA-ARTICULAR; INTRALESIONAL; INTRAMUSCULAR; INTRAVENOUS; SOFT TISSUE AS NEEDED
Status: DISCONTINUED | OUTPATIENT
Start: 2023-02-18 | End: 2023-02-18 | Stop reason: SURG

## 2023-02-18 RX ORDER — OXYCODONE AND ACETAMINOPHEN 7.5; 325 MG/1; MG/1
1 TABLET ORAL EVERY 4 HOURS PRN
Status: DISCONTINUED | OUTPATIENT
Start: 2023-02-18 | End: 2023-02-18 | Stop reason: HOSPADM

## 2023-02-18 RX ORDER — ONDANSETRON 2 MG/ML
4 INJECTION INTRAMUSCULAR; INTRAVENOUS ONCE AS NEEDED
Status: COMPLETED | OUTPATIENT
Start: 2023-02-18 | End: 2023-02-18

## 2023-02-18 RX ORDER — FENTANYL CITRATE 50 UG/ML
50 INJECTION, SOLUTION INTRAMUSCULAR; INTRAVENOUS
Status: DISCONTINUED | OUTPATIENT
Start: 2023-02-18 | End: 2023-02-18 | Stop reason: HOSPADM

## 2023-02-18 RX ORDER — SODIUM CHLORIDE, SODIUM LACTATE, POTASSIUM CHLORIDE, CALCIUM CHLORIDE 600; 310; 30; 20 MG/100ML; MG/100ML; MG/100ML; MG/100ML
INJECTION, SOLUTION INTRAVENOUS CONTINUOUS PRN
Status: DISCONTINUED | OUTPATIENT
Start: 2023-02-18 | End: 2023-02-18 | Stop reason: SURG

## 2023-02-18 RX ORDER — SODIUM CHLORIDE, SODIUM LACTATE, POTASSIUM CHLORIDE, CALCIUM CHLORIDE 600; 310; 30; 20 MG/100ML; MG/100ML; MG/100ML; MG/100ML
9 INJECTION, SOLUTION INTRAVENOUS CONTINUOUS
Status: DISCONTINUED | OUTPATIENT
Start: 2023-02-18 | End: 2023-02-18

## 2023-02-18 RX ORDER — SODIUM CHLORIDE, SODIUM LACTATE, POTASSIUM CHLORIDE, CALCIUM CHLORIDE 600; 310; 30; 20 MG/100ML; MG/100ML; MG/100ML; MG/100ML
1000 INJECTION, SOLUTION INTRAVENOUS CONTINUOUS
Status: DISCONTINUED | OUTPATIENT
Start: 2023-02-18 | End: 2023-02-18

## 2023-02-18 RX ORDER — LIDOCAINE HYDROCHLORIDE 10 MG/ML
0.5 INJECTION, SOLUTION EPIDURAL; INFILTRATION; INTRACAUDAL; PERINEURAL ONCE AS NEEDED
Status: DISCONTINUED | OUTPATIENT
Start: 2023-02-18 | End: 2023-02-18 | Stop reason: HOSPADM

## 2023-02-18 RX ORDER — KETOROLAC TROMETHAMINE 30 MG/ML
30 INJECTION, SOLUTION INTRAMUSCULAR; INTRAVENOUS EVERY 6 HOURS PRN
Status: DISCONTINUED | OUTPATIENT
Start: 2023-02-18 | End: 2023-02-19 | Stop reason: HOSPADM

## 2023-02-18 RX ORDER — DIPHENHYDRAMINE HCL 25 MG
25 CAPSULE ORAL
Status: DISCONTINUED | OUTPATIENT
Start: 2023-02-18 | End: 2023-02-18 | Stop reason: HOSPADM

## 2023-02-18 RX ORDER — LABETALOL HYDROCHLORIDE 5 MG/ML
5 INJECTION, SOLUTION INTRAVENOUS
Status: DISCONTINUED | OUTPATIENT
Start: 2023-02-18 | End: 2023-02-18 | Stop reason: HOSPADM

## 2023-02-18 RX ORDER — SODIUM CHLORIDE 0.9 % (FLUSH) 0.9 %
3-10 SYRINGE (ML) INJECTION AS NEEDED
Status: DISCONTINUED | OUTPATIENT
Start: 2023-02-18 | End: 2023-02-18 | Stop reason: HOSPADM

## 2023-02-18 RX ORDER — SODIUM CHLORIDE 0.9 % (FLUSH) 0.9 %
3 SYRINGE (ML) INJECTION EVERY 12 HOURS SCHEDULED
Status: DISCONTINUED | OUTPATIENT
Start: 2023-02-18 | End: 2023-02-18 | Stop reason: HOSPADM

## 2023-02-18 RX ORDER — NEOSTIGMINE METHYLSULFATE 0.5 MG/ML
INJECTION, SOLUTION INTRAVENOUS AS NEEDED
Status: DISCONTINUED | OUTPATIENT
Start: 2023-02-18 | End: 2023-02-18 | Stop reason: SURG

## 2023-02-18 RX ORDER — HYDROCODONE BITARTRATE AND ACETAMINOPHEN 7.5; 325 MG/1; MG/1
1 TABLET ORAL ONCE AS NEEDED
Status: DISCONTINUED | OUTPATIENT
Start: 2023-02-18 | End: 2023-02-18 | Stop reason: HOSPADM

## 2023-02-18 RX ORDER — HYDRALAZINE HYDROCHLORIDE 20 MG/ML
5 INJECTION INTRAMUSCULAR; INTRAVENOUS
Status: DISCONTINUED | OUTPATIENT
Start: 2023-02-18 | End: 2023-02-18 | Stop reason: HOSPADM

## 2023-02-18 RX ORDER — MAGNESIUM HYDROXIDE 1200 MG/15ML
LIQUID ORAL AS NEEDED
Status: DISCONTINUED | OUTPATIENT
Start: 2023-02-18 | End: 2023-02-18 | Stop reason: HOSPADM

## 2023-02-18 RX ORDER — DROPERIDOL 2.5 MG/ML
0.62 INJECTION, SOLUTION INTRAMUSCULAR; INTRAVENOUS ONCE AS NEEDED
Status: DISCONTINUED | OUTPATIENT
Start: 2023-02-18 | End: 2023-02-18 | Stop reason: HOSPADM

## 2023-02-18 RX ORDER — NALOXONE HCL 0.4 MG/ML
0.2 VIAL (ML) INJECTION AS NEEDED
Status: DISCONTINUED | OUTPATIENT
Start: 2023-02-18 | End: 2023-02-18 | Stop reason: HOSPADM

## 2023-02-18 RX ORDER — ACETAMINOPHEN 325 MG/1
650 TABLET ORAL DAILY
Status: DISCONTINUED | OUTPATIENT
Start: 2023-02-18 | End: 2023-02-19 | Stop reason: HOSPADM

## 2023-02-18 RX ORDER — MIDAZOLAM HYDROCHLORIDE 1 MG/ML
1 INJECTION INTRAMUSCULAR; INTRAVENOUS
Status: DISCONTINUED | OUTPATIENT
Start: 2023-02-18 | End: 2023-02-18 | Stop reason: HOSPADM

## 2023-02-18 RX ORDER — PROMETHAZINE HYDROCHLORIDE 25 MG/1
25 TABLET ORAL ONCE AS NEEDED
Status: DISCONTINUED | OUTPATIENT
Start: 2023-02-18 | End: 2023-02-18 | Stop reason: HOSPADM

## 2023-02-18 RX ORDER — FENTANYL CITRATE 50 UG/ML
INJECTION, SOLUTION INTRAMUSCULAR; INTRAVENOUS AS NEEDED
Status: DISCONTINUED | OUTPATIENT
Start: 2023-02-18 | End: 2023-02-18 | Stop reason: SURG

## 2023-02-18 RX ORDER — MIDAZOLAM HYDROCHLORIDE 1 MG/ML
1 INJECTION INTRAMUSCULAR; INTRAVENOUS ONCE
Status: COMPLETED | OUTPATIENT
Start: 2023-02-18 | End: 2023-02-18

## 2023-02-18 RX ORDER — INDOCYANINE GREEN AND WATER 25 MG
2.5 KIT INJECTION ONCE
Status: COMPLETED | OUTPATIENT
Start: 2023-02-18 | End: 2023-02-18

## 2023-02-18 RX ORDER — SODIUM CHLORIDE 0.9 % (FLUSH) 0.9 %
10 SYRINGE (ML) INJECTION EVERY 12 HOURS SCHEDULED
Status: DISCONTINUED | OUTPATIENT
Start: 2023-02-18 | End: 2023-02-18 | Stop reason: HOSPADM

## 2023-02-18 RX ORDER — EPHEDRINE SULFATE 50 MG/ML
5 INJECTION, SOLUTION INTRAVENOUS ONCE AS NEEDED
Status: DISCONTINUED | OUTPATIENT
Start: 2023-02-18 | End: 2023-02-18 | Stop reason: HOSPADM

## 2023-02-18 RX ORDER — HYDROMORPHONE HYDROCHLORIDE 1 MG/ML
0.5 INJECTION, SOLUTION INTRAMUSCULAR; INTRAVENOUS; SUBCUTANEOUS
Status: DISCONTINUED | OUTPATIENT
Start: 2023-02-18 | End: 2023-02-18 | Stop reason: HOSPADM

## 2023-02-18 RX ORDER — PROMETHAZINE HYDROCHLORIDE 25 MG/1
25 SUPPOSITORY RECTAL ONCE AS NEEDED
Status: DISCONTINUED | OUTPATIENT
Start: 2023-02-18 | End: 2023-02-18 | Stop reason: HOSPADM

## 2023-02-18 RX ORDER — DIPHENHYDRAMINE HYDROCHLORIDE 50 MG/ML
12.5 INJECTION INTRAMUSCULAR; INTRAVENOUS
Status: DISCONTINUED | OUTPATIENT
Start: 2023-02-18 | End: 2023-02-18 | Stop reason: HOSPADM

## 2023-02-18 RX ORDER — SODIUM CHLORIDE 9 MG/ML
INJECTION, SOLUTION INTRAVENOUS AS NEEDED
Status: DISCONTINUED | OUTPATIENT
Start: 2023-02-18 | End: 2023-02-18 | Stop reason: HOSPADM

## 2023-02-18 RX ORDER — FAMOTIDINE 10 MG/ML
20 INJECTION, SOLUTION INTRAVENOUS ONCE
Status: DISCONTINUED | OUTPATIENT
Start: 2023-02-18 | End: 2023-02-18 | Stop reason: HOSPADM

## 2023-02-18 RX ORDER — GLYCOPYRROLATE 0.2 MG/ML
INJECTION INTRAMUSCULAR; INTRAVENOUS AS NEEDED
Status: DISCONTINUED | OUTPATIENT
Start: 2023-02-18 | End: 2023-02-18 | Stop reason: SURG

## 2023-02-18 RX ADMIN — HYDROMORPHONE HYDROCHLORIDE 0.5 MG: 1 INJECTION, SOLUTION INTRAMUSCULAR; INTRAVENOUS; SUBCUTANEOUS at 12:33

## 2023-02-18 RX ADMIN — Medication 10 MG: at 13:32

## 2023-02-18 RX ADMIN — INDOCYANINE GREEN AND WATER 2.5 MG: KIT at 10:24

## 2023-02-18 RX ADMIN — FAMOTIDINE 20 MG: 10 INJECTION INTRAVENOUS at 08:02

## 2023-02-18 RX ADMIN — FENTANYL CITRATE 50 MCG: 50 INJECTION, SOLUTION INTRAMUSCULAR; INTRAVENOUS at 12:48

## 2023-02-18 RX ADMIN — OXYCODONE HYDROCHLORIDE AND ACETAMINOPHEN 1 TABLET: 7.5; 325 TABLET ORAL at 12:54

## 2023-02-18 RX ADMIN — PROPOFOL 180 MG: 10 INJECTION, EMULSION INTRAVENOUS at 10:40

## 2023-02-18 RX ADMIN — Medication 10 ML: at 21:21

## 2023-02-18 RX ADMIN — DIPHENHYDRAMINE HYDROCHLORIDE 25 MG: 50 INJECTION, SOLUTION INTRAMUSCULAR; INTRAVENOUS at 14:43

## 2023-02-18 RX ADMIN — DOCUSATE SODIUM 50MG AND SENNOSIDES 8.6MG 2 TABLET: 8.6; 5 TABLET, FILM COATED ORAL at 08:02

## 2023-02-18 RX ADMIN — ONDANSETRON 4 MG: 2 INJECTION INTRAMUSCULAR; INTRAVENOUS at 10:54

## 2023-02-18 RX ADMIN — METRONIDAZOLE 500 MG: 500 INJECTION, SOLUTION INTRAVENOUS at 05:19

## 2023-02-18 RX ADMIN — HYDROCODONE BITARTRATE AND ACETAMINOPHEN 1 TABLET: 7.5; 325 TABLET ORAL at 07:58

## 2023-02-18 RX ADMIN — FLUTICASONE PROPIONATE 2 SPRAY: 50 SPRAY, METERED NASAL at 08:02

## 2023-02-18 RX ADMIN — HYDROMORPHONE HYDROCHLORIDE 0.5 MG: 1 INJECTION, SOLUTION INTRAMUSCULAR; INTRAVENOUS; SUBCUTANEOUS at 12:41

## 2023-02-18 RX ADMIN — ACETAMINOPHEN 650 MG: 325 TABLET, FILM COATED ORAL at 14:43

## 2023-02-18 RX ADMIN — GLYCOPYRROLATE 0.4 MCG: 1 INJECTION INTRAMUSCULAR; INTRAVENOUS at 11:58

## 2023-02-18 RX ADMIN — GLYCOPYRROLATE 0.2 MCG: 1 INJECTION INTRAMUSCULAR; INTRAVENOUS at 10:58

## 2023-02-18 RX ADMIN — FENTANYL CITRATE 50 MCG: 50 INJECTION, SOLUTION INTRAMUSCULAR; INTRAVENOUS at 12:23

## 2023-02-18 RX ADMIN — FAMOTIDINE 20 MG: 10 INJECTION INTRAVENOUS at 02:12

## 2023-02-18 RX ADMIN — ONDANSETRON 4 MG: 2 INJECTION INTRAMUSCULAR; INTRAVENOUS at 08:01

## 2023-02-18 RX ADMIN — SODIUM CHLORIDE, POTASSIUM CHLORIDE, SODIUM LACTATE AND CALCIUM CHLORIDE 9 ML/HR: 600; 310; 30; 20 INJECTION, SOLUTION INTRAVENOUS at 10:28

## 2023-02-18 RX ADMIN — DEXAMETHASONE SODIUM PHOSPHATE 8 MG: 4 INJECTION, SOLUTION INTRAMUSCULAR; INTRAVENOUS at 10:54

## 2023-02-18 RX ADMIN — SODIUM CHLORIDE, POTASSIUM CHLORIDE, SODIUM LACTATE AND CALCIUM CHLORIDE 1000 ML: 600; 310; 30; 20 INJECTION, SOLUTION INTRAVENOUS at 10:25

## 2023-02-18 RX ADMIN — HYDROMORPHONE HYDROCHLORIDE 0.5 MG: 1 INJECTION, SOLUTION INTRAMUSCULAR; INTRAVENOUS; SUBCUTANEOUS at 12:28

## 2023-02-18 RX ADMIN — LIDOCAINE HYDROCHLORIDE 60 MG: 20 INJECTION, SOLUTION INFILTRATION; PERINEURAL at 10:40

## 2023-02-18 RX ADMIN — METRONIDAZOLE 500 MG: 500 INJECTION, SOLUTION INTRAVENOUS at 14:43

## 2023-02-18 RX ADMIN — KETOROLAC TROMETHAMINE 30 MG: 30 INJECTION, SOLUTION INTRAMUSCULAR; INTRAVENOUS at 21:18

## 2023-02-18 RX ADMIN — FENTANYL CITRATE 50 MCG: 50 INJECTION, SOLUTION INTRAMUSCULAR; INTRAVENOUS at 12:56

## 2023-02-18 RX ADMIN — ROCURONIUM BROMIDE 50 MG: 10 INJECTION INTRAVENOUS at 10:40

## 2023-02-18 RX ADMIN — HYDROMORPHONE HYDROCHLORIDE 0.5 MG: 1 INJECTION, SOLUTION INTRAMUSCULAR; INTRAVENOUS; SUBCUTANEOUS at 11:15

## 2023-02-18 RX ADMIN — SODIUM CHLORIDE 250 MG: 9 INJECTION, SOLUTION INTRAVENOUS at 14:43

## 2023-02-18 RX ADMIN — Medication 10 MG: at 13:12

## 2023-02-18 RX ADMIN — CETIRIZINE HYDROCHLORIDE 10 MG: 10 TABLET ORAL at 08:02

## 2023-02-18 RX ADMIN — NEOSTIGMINE METHYLSULFATE 3 MG: 0.5 INJECTION INTRAVENOUS at 11:58

## 2023-02-18 RX ADMIN — SODIUM CHLORIDE, POTASSIUM CHLORIDE, SODIUM LACTATE AND CALCIUM CHLORIDE: 600; 310; 30; 20 INJECTION, SOLUTION INTRAVENOUS at 10:36

## 2023-02-18 RX ADMIN — ONDANSETRON 4 MG: 2 INJECTION INTRAMUSCULAR; INTRAVENOUS at 12:53

## 2023-02-18 RX ADMIN — Medication 10 ML: at 08:02

## 2023-02-18 RX ADMIN — HYDROMORPHONE HYDROCHLORIDE 0.5 MG: 1 INJECTION, SOLUTION INTRAMUSCULAR; INTRAVENOUS; SUBCUTANEOUS at 17:24

## 2023-02-18 RX ADMIN — ONDANSETRON 4 MG: 2 INJECTION INTRAMUSCULAR; INTRAVENOUS at 17:28

## 2023-02-18 RX ADMIN — ACETAMINOPHEN 650 MG: 325 TABLET, FILM COATED ORAL at 05:30

## 2023-02-18 RX ADMIN — METRONIDAZOLE 500 MG: 500 INJECTION, SOLUTION INTRAVENOUS at 21:22

## 2023-02-18 RX ADMIN — LEVOFLOXACIN 750 MG: 5 INJECTION, SOLUTION INTRAVENOUS at 02:13

## 2023-02-18 RX ADMIN — FENTANYL CITRATE 50 MCG: 50 INJECTION, SOLUTION INTRAMUSCULAR; INTRAVENOUS at 10:40

## 2023-02-18 RX ADMIN — MIDAZOLAM 1 MG: 1 INJECTION INTRAMUSCULAR; INTRAVENOUS at 13:10

## 2023-02-18 RX ADMIN — FAMOTIDINE 20 MG: 10 INJECTION INTRAVENOUS at 21:21

## 2023-02-18 NOTE — ANESTHESIA PREPROCEDURE EVALUATION
Anesthesia Evaluation     Patient summary reviewed and Nursing notes reviewed   no history of anesthetic complications:  NPO Solid Status: > 8 hours  NPO Liquid Status: > 8 hours           Airway   Mallampati: II  TM distance: >3 FB  Neck ROM: full  No difficulty expected  Dental - normal exam     Pulmonary - normal exam   Cardiovascular - normal exam    (+) hypertension well controlled,       Neuro/Psych  GI/Hepatic/Renal/Endo    (+) obesity,  GI bleeding ,     Musculoskeletal     Abdominal    Substance History       Comment: On suboxone   OB/GYN          Other   blood dyscrasia anemia,     Arthritis: Hgb 8.2.                  Anesthesia Plan    ASA 3     general     intravenous induction     Anesthetic plan, risks, benefits, and alternatives have been provided, discussed and informed consent has been obtained with: patient.    Plan discussed with CRNA.

## 2023-02-18 NOTE — ED PROVIDER NOTES
EMERGENCY DEPARTMENT ENCOUNTER    Room Number:  17/17  Date seen:  2/17/2023  PCP: Provider, No Known      HPI:  Chief Complaint: Abdominal pain  A complete HPI/ROS/PMH/PSH/SH/FH are unobtainable due to: None  Context: Emil Vo is a 47 y.o. male who presents to the ED c/o abdominal pain.  Right upper quadrant pain that is been intermittent for the past 2 weeks.  It occurs whenever he eats, particular spicy foods and fatty foods.  We will last for several hours at a time.  He is currently pain-free.  No associated fever, vomiting, diarrhea, urinary symptoms.          PAST MEDICAL HISTORY  Active Ambulatory Problems     Diagnosis Date Noted   • No Active Ambulatory Problems     Resolved Ambulatory Problems     Diagnosis Date Noted   • No Resolved Ambulatory Problems     Past Medical History:   Diagnosis Date   • Anxiety    • Arthritis    • Blood in semen    • Colon polyp    • Hypertension    • Migraine    • Seasonal allergies          PAST SURGICAL HISTORY  Past Surgical History:   Procedure Laterality Date   • ANAL FISSURECTOMY     • COLONOSCOPY N/A 5/10/2017    Procedure: COLONOSCOPY TO CECUM & T.I. ;  Surgeon: Truman FRIEND MD;  Location: [facility] ENDOSCOPY;  Service:    • ENDOSCOPY  5/10/2017    Procedure: ESOPHAGOGASTRODUODENOSCOPY WITH COLD BIOPSIES;  Surgeon: Truman FRIEND MD;  Location: [facility] ENDOSCOPY;  Service:          FAMILY HISTORY  Family History   Problem Relation Age of Onset   • No Known Problems Mother          SOCIAL HISTORY  Social History     Socioeconomic History   • Marital status: Single   Tobacco Use   • Smoking status: Never   • Smokeless tobacco: Never   Vaping Use   • Vaping Use: Never used   Substance and Sexual Activity   • Alcohol use: No   • Drug use: No   • Sexual activity: Defer         ALLERGIES  Penicillins and Sudafed [pseudoephedrine]        REVIEW OF SYSTEMS  Review of Systems     All systems reviewed and negative except for those discussed in HPI.        PHYSICAL EXAM  ED Triage Vitals   Temp Heart Rate Resp BP SpO2   02/17/23 1813 02/17/23 1813 02/17/23 1813 02/17/23 1829 02/17/23 1813   98.5 °F (36.9 °C) 80 16 148/81 98 %      Temp src Heart Rate Source Patient Position BP Location FiO2 (%)   02/17/23 1813 02/17/23 1813 -- -- --   Tympanic Monitor          Physical Exam      GENERAL: no acute distress  HENT: nares patent  EYES: no scleral icterus  CV: regular rhythm, normal rate  RESPIRATORY: normal effort, clear to auscultation bilaterally  ABDOMEN: soft, nontender  MUSCULOSKELETAL: no deformity  NEURO: alert, moves all extremities, follows commands  PSYCH:  calm, cooperative  SKIN: warm, dry    Vital signs and nursing notes reviewed.          LAB RESULTS  Recent Results (from the past 24 hour(s))   Comprehensive Metabolic Panel    Collection Time: 02/17/23  6:39 PM    Specimen: Blood   Result Value Ref Range    Glucose 117 (H) 65 - 99 mg/dL    BUN 14 6 - 20 mg/dL    Creatinine 0.98 0.76 - 1.27 mg/dL    Sodium 138 136 - 145 mmol/L    Potassium 3.5 3.5 - 5.2 mmol/L    Chloride 102 98 - 107 mmol/L    CO2 27.0 22.0 - 29.0 mmol/L    Calcium 9.3 8.6 - 10.5 mg/dL    Total Protein 7.5 6.0 - 8.5 g/dL    Albumin 4.7 3.5 - 5.2 g/dL    ALT (SGPT) 16 1 - 41 U/L    AST (SGOT) 15 1 - 40 U/L    Alkaline Phosphatase 110 39 - 117 U/L    Total Bilirubin 0.4 0.0 - 1.2 mg/dL    Globulin 2.8 gm/dL    A/G Ratio 1.7 g/dL    BUN/Creatinine Ratio 14.3 7.0 - 25.0    Anion Gap 9.0 5.0 - 15.0 mmol/L    eGFR 96.3 >60.0 mL/min/1.73   Lipase    Collection Time: 02/17/23  6:39 PM    Specimen: Blood   Result Value Ref Range    Lipase 7 (L) 13 - 60 U/L   CBC Auto Differential    Collection Time: 02/17/23  6:39 PM    Specimen: Blood   Result Value Ref Range    WBC 6.13 3.40 - 10.80 10*3/mm3    RBC 5.13 4.14 - 5.80 10*6/mm3    Hemoglobin 9.3 (L) 13.0 - 17.7 g/dL    Hematocrit 32.4 (L) 37.5 - 51.0 %    MCV 63.2 (L) 79.0 - 97.0 fL    MCH 18.1 (L) 26.6 - 33.0 pg    MCHC 28.7 (L) 31.5 - 35.7 g/dL     RDW 16.6 (H) 12.3 - 15.4 %    RDW-SD 36.7 (L) 37.0 - 54.0 fl    MPV 9.8 6.0 - 12.0 fL    Platelets 319 140 - 450 10*3/mm3    nRBC 0.0 0.0 - 0.2 /100 WBC       Ordered the above labs and reviewed the results.        RADIOLOGY  No Radiology Exams Resulted Within Past 24 Hours    Ordered the above noted radiological studies. Reviewed by me in PACS.          PROCEDURES  Procedures          MEDICATIONS GIVEN IN ER  Medications   sodium chloride 0.9 % flush 10 mL (has no administration in time range)         MEDICAL DECISION MAKING, PROGRESS, and CONSULTS    All labs have been independently reviewed by me.  All radiology studies have been reviewed by me and discussed with radiologist dictating the report.   EKG's independently viewed and interpreted by me.  Discussion below represents my analysis of pertinent findings related to patient's condition, differential diagnosis, treatment plan and final disposition.      Orders placed during this visit:  Orders Placed This Encounter   Procedures   • US Gallbladder   • [place] Draw   • Comprehensive Metabolic Panel   • Lipase   • Urinalysis With Microscopic If Indicated (No Culture) - Urine, Clean Catch   • CBC Auto Differential   • Manual Differential   • NPO Diet NPO Type: Strict NPO   • Undress & Gown   • Insert Peripheral IV   • CBC & Differential   • Green Top (Gel)   • Lavender Top   • Gold Top - SST   • Light Blue Top               Differential diagnosis:    Differential diagnosis includes but not limited to:  - hepatobiliary pathology such as cholecystitis, cholangitis, and symptomatic cholelithiasis  - Pancreatitis  - Dyspepsia  - Small bowel obstruction  - Appendicitis  - Diverticulitis  - UTI including pyelonephritis  - Ureteral stone  - Zoster  - Colitis, including infectious and ischemic        Independent interpretation of labs, radiology studies, and discussions with consultants:  ED Course as of 02/18/23 0200   Fri Feb 17, 2023 1926 WBC: 6.13 [TD]   1926  Hemoglobin(!): 9.3 [TD]   1926 Glucose(!): 117 [TD]   1926 Creatinine: 0.98 [TD]   1926 Lipase(!): 7 [TD]   2025 Leukocytes, UA: Negative [TD]   2025 Nitrite, UA: Negative [TD]   2026 Hemoglobin(!): 9.3 [TD]   2026 MCV(!): 63.2 [TD]   2047 I discussed the CBC differential normalities with Dr. Mendoza, hematology.  We reviewed the patient's labs.  He recommends outpatient follow-up for his chronic anemia. [TD]   2118 I discussed the case with Dr. Leung, hospitalist.  We reviewed patient's labs, history, imaging.  He will admit. [TD]   2237 I initially discussed the case Dr. Warner based on verbal report.  However, final report from radiology was suspicious for cholecystitis.  Therefore, I requested a repeat call back to general surgery to update them on the final read. [TD]      ED Course User Index  [TD] Aidan Hart II, MD                  PPE: The patient wore a mask throughout the entire encounter. I wore a well-fitting mask.    DIAGNOSIS  Final diagnoses:   Cholecystitis   Microcytic anemia         DISPOSITION  Admit      Latest Documented Vital Signs:  As of 19:29 EST  BP- 148/81 HR- 80 Temp- 98.5 °F (36.9 °C) (Tympanic) O2 sat- 98%      --    Please note that portions of this were completed with a voice recognition program.       Note Disclaimer: At [facility], we believe that sharing information builds trust and better relationships. You are receiving this note because you are receiving care at [facility] or recently visited. It is possible you will see health information before a provider has talked with you about it. This kind of information can be easy to misunderstand. To help you fully understand what it means for your health, we urge you to discuss this note with your provider.       Aidan Hart II, MD  02/18/23 020

## 2023-02-18 NOTE — ED NOTES
Nursing report ED to floor  Emil Vo  47 y.o.  male    HPI :   Chief Complaint   Patient presents with    Abdominal Pain       Admitting doctor:   Wagner Grullon MD    Admitting diagnosis:   The primary encounter diagnosis was Cholecystitis. A diagnosis of Microcytic anemia was also pertinent to this visit.    Code status:   Current Code Status       Date Active Code Status Order ID Comments User Context       Not on file            Allergies:   Penicillins and Sudafed [pseudoephedrine]    Isolation:   No active isolations    Intake and Output  No intake or output data in the 24 hours ending 02/17/23 2124    Weight:       02/17/23 1827   Weight: 99.8 kg (220 lb)       Most recent vitals:   Vitals:    02/17/23 1941 02/17/23 2011 02/17/23 2111 02/17/23 2120   BP: 133/74 124/70 131/73    Pulse: 58 56 55 55   Resp:       Temp:       TempSrc:       SpO2: 96% 97% 100% 96%   Weight:       Height:           Active LDAs/IV Access:   Lines, Drains & Airways       Active LDAs       None                    Labs (abnormal labs have a star):   Labs Reviewed   COMPREHENSIVE METABOLIC PANEL - Abnormal; Notable for the following components:       Result Value    Glucose 117 (*)     All other components within normal limits    Narrative:     GFR Normal >60  Chronic Kidney Disease <60  Kidney Failure <15     LIPASE - Abnormal; Notable for the following components:    Lipase 7 (*)     All other components within normal limits   URINALYSIS W/ MICROSCOPIC IF INDICATED (NO CULTURE) - Abnormal; Notable for the following components:    Color, UA Dark Yellow (*)     Ketones, UA Trace (*)     Protein, UA Trace (*)     All other components within normal limits    Narrative:     Urine microscopic not indicated.   CBC WITH AUTO DIFFERENTIAL - Abnormal; Notable for the following components:    Hemoglobin 9.3 (*)     Hematocrit 32.4 (*)     MCV 63.2 (*)     MCH 18.1 (*)     MCHC 28.7 (*)     RDW 16.6 (*)     RDW-SD 36.7 (*)     All  other components within normal limits   MANUAL DIFFERENTIAL - Abnormal; Notable for the following components:    Neutrophil % 82.8 (*)     Lymphocyte % 9.1 (*)     Basophil % 2.0 (*)     Lymphocytes Absolute 0.56 (*)     All other components within normal limits   BLOOD CULTURE   BLOOD CULTURE   RAINBOW DRAW    Narrative:     The following orders were created for panel order [place] Draw.  Procedure                               Abnormality         Status                     ---------                               -----------         ------                     Green Top (Gel)[[id number]]                                  Final result               Lavender Top[[id number]]                                     Final result               Gold Top - SST[[id number]]                                   Final result               Light Blue Top[[id number]]                                   Final result                 Please view results for these tests on the individual orders.   LACTIC ACID, PLASMA   CBC AND DIFFERENTIAL    Narrative:     The following orders were created for panel order CBC & Differential.  Procedure                               Abnormality         Status                     ---------                               -----------         ------                     CBC Auto Differential[[id number]]        Abnormal            Final result                 Please view results for these tests on the individual orders.   GREEN TOP   LAVENDER TOP   GOLD TOP - SST   LIGHT BLUE TOP       EKG:   No orders to display       Meds given in ED:   Medications   sodium chloride 0.9 % flush 10 mL (has no administration in time range)   cefTRIAXone (ROCEPHIN) 1 g in sodium chloride 0.9 % 100 mL IVPB-VTB (has no administration in time range)   metroNIDAZOLE (FLAGYL) IVPB 500 mg (has no administration in time range)       Imaging results:  US Gallbladder    Result Date: 2/17/2023  1. There is cholelithiasis with findings suspicious for  acute cholecystitis. Surgical evaluation is recommended.  This was communicated to Dr. Aidan Hart in the emergency room on 02/17/2023 at 8:08 PM.  This report was finalized on 2/17/2023 8:14 PM by Dr. Rubin Monsalve M.D.       Ambulatory status:   - UP AD FARAZ    Social issues:   Social History     Socioeconomic History    Marital status: Single   Tobacco Use    Smoking status: Never    Smokeless tobacco: Never   Vaping Use    Vaping Use: Never used   Substance and Sexual Activity    Alcohol use: No    Drug use: No    Sexual activity: Defer       NIH Stroke Scale:         Flory Jarrett RN  02/17/23 21:24 EST

## 2023-02-18 NOTE — ANESTHESIA PROCEDURE NOTES
Airway  Urgency: elective    Date/Time: 2/18/2023 10:43 AM  Airway not difficult    General Information and Staff    Patient location during procedure: OR  CRNA/CAA: Monique Noe CRNA    Indications and Patient Condition  Indications for airway management: airway protection    Preoxygenated: yes  Mask difficulty assessment: 1 - vent by mask    Final Airway Details  Final airway type: endotracheal airway      Successful airway: ETT  Cuffed: yes   Successful intubation technique: direct laryngoscopy  Endotracheal tube insertion site: oral  Blade: Pablo  Blade size: 4  ETT size (mm): 7.5  Cormack-Lehane Classification: grade I - full view of glottis  Placement verified by: chest auscultation and capnometry   Cuff volume (mL): 6  Measured from: lips  ETT/EBT  to lips (cm): 21  Number of attempts at approach: 1  Assessment: lips, teeth, and gum same as pre-op and atraumatic intubation    Additional Comments  Smooth IV induction. Trachea intubated. Cuff up. Dentition intact without injury.

## 2023-02-18 NOTE — CONSULTS
Cc: Abdominal pain, nausea, cholelithiasis, hemorrhoids    History of presenting illness:   This is a very nice, generally healthy 47-year-old gentleman with a longstanding history of anemia and rectal bleeding which she relates to hemorrhoids.  He underwent a hemorrhoidectomy sometime around 10 years ago and had a miserable experience.  He says that he had some intermittent troubles with right upper quadrant pain which really had not given him much trouble until the last couple of weeks when he had more severe trouble, essentially almost anytime has been eating having pain and nausea, some radiation of discomfort into his back and chest.    Past Medical History: Anxiety, hypertension, hemorrhoids, hypertension    Past Surgical History: EGD and colonoscopy 2017 without any significant findings, just some hemorrhoids, he describes an anal fissurectomy, although I suspect this was more likely of a 3 column hemorrhoidectomy.  No abdominal surgery.    Medications: Flonase, Atarax, hydroxyzine, Suboxone, Imitrex, lisinopril    Allergies: Penicillin (he describes anaphylaxis)    Social History: History of narcotic abuse, non-smoker    Family History: Negative for known colon or rectal cancer, he believes his mother may have had gallbladder disease    Review of Systems:  Constitutional: Positive for decreased appetite and unintentional weight loss of around 15 pounds over the last 2 weeks.  Denies fever  Neck: no swollen glands or dysphagia or odynophagia  Respiratory: negative for SOB, cough, hemoptysis or wheezing  Cardiovascular: negative for chest pain, palpitations or peripheral edema  Gastrointestinal: Positive for nausea, bloating, abdominal pain      Physical Exam:  BMI: 30.6  General: alert and oriented, appropriate, no acute distress  Eyes: No scleral icterus, extraocular movements are intact  Neck: Supple without lymphadenopathy or thyromegaly, trachea is in the midline  Respiratory: There is good bilateral  chest expansion, no use of accessory muscles is noted  Cardiovascular: No jugular venous distention or peripheral edema is seen  Gastrointestinal: Soft, mild epigastric and right upper quadrant tenderness without true guarding, no mass or hernia    Laboratory data: White blood cell count 6.3 hemoglobin 8.2 after some fluid administration, chemistries unremarkable, liver function studies normal    Imaging data: He has had a few CT scans over the years which have been unremarkable.  Gallbladder ultrasound done yesterday demonstrated stones and possible wall thickening, common bile duct of 3 mm      Assessment and plan:   -Symptomatic cholelithiasis, possible early acute cholecystitis, options discussed with patient and I think it is reasonable to proceed with laparoscopic/robotic cholecystectomy and possible cholangiogram, possible open.  The patient understands it is possible this will not resolve all of his abdominal concerns.    Risks associated with the procedure are noted to include, but not be limited to, bleeding, infection, injury to intra-abdominal structures including the liver, small and large intestine, stomach, duodenum, common bile duct and major vascular structures.  Possible need for conversion to open surgery, further revisional surgery, postoperative ERCP discussed.    -Anemia, probably related to hemorrhoids.  I have suggested to the patient that long-term fiber supplementation would be beneficial.  He is already trying MiraLAX with some benefits.  I think that warm soaks and Anusol ointment would also be beneficial.  Longer-term he might benefit from repeat colonoscopy, although I doubt this is likely to  strategy.  The patient is clear that he would never again want to undergo surgical hemorrhoidectomy.      Cristi Au MD, FACS  General, Minimally Invasive and Endoscopic Surgery  [facility]    [address], [address]  [place], [place], [address]  P:  [phone]  F: [phone]

## 2023-02-18 NOTE — ANESTHESIA POSTPROCEDURE EVALUATION
"Patient: Emil Vo    Procedure Summary     Date: 02/18/23 Room / Location: [facility] OR [address] [place] [address] [facility]    Anesthesia Start: 1036 Anesthesia Stop: 1224    Procedure: CHOLECYSTECTOMY LAPAROSCOPIC WITH DAVINCI ROBOT AND IOC (Abdomen) Diagnosis:       Calculus of gallbladder with chronic cholecystitis without obstruction      (Calculus of gallbladder with chronic cholecystitis without obstruction [K80.10])    Surgeons: Cristi Au MD Provider: Nasim Khan MD    Anesthesia Type: general ASA Status: 3          Anesthesia Type: general    Vitals  Vitals Value Taken Time   /81 02/18/23 1331   Temp 36.9 °C (98.4 °F) 02/18/23 1218   Pulse 93 02/18/23 1345   Resp 18 02/18/23 1315   SpO2 100 % 02/18/23 1345   Vitals shown include unvalidated device data.        Post Anesthesia Care and Evaluation    Patient location during evaluation: PACU  Patient participation: complete - patient participated  Level of consciousness: awake and alert  Pain management: adequate    Airway patency: patent  Anesthetic complications: No anesthetic complications    Cardiovascular status: acceptable  Respiratory status: acceptable  Hydration status: acceptable    Comments: /82   Pulse 52   Temp 36.9 °C (98.4 °F) (Oral)   Resp 18   Ht 177.8 cm (70\")   Wt 96.9 kg (213 lb 10 oz)   SpO2 100%   BMI 30.65 kg/m²         "

## 2023-02-18 NOTE — CONSULTS
GI CONSULT  NOTE:    Referring Provider:  Dr. Au    Chief complaint: possible choledocholithiasis     Subjective .     History of present illness: Emil Vo is a 47 y.o. male with history of chronic iron deficiency anemia, bleeding hemorrhoids, narcotic abuse (rx Suboxone), HTN, and anxiety.  He is s/p cholecystectomy indicated for cholelithiasis and cholecystitis, procedure performed by Dr. Au earlier today.  IOC during procedure raises suspicion for choledocholithiasis.  Liver enzymes are normal.  Patient is sore due to postoperative status.  He has 4 incision points from procedure.  He sees rectal bleeding often relating to his hemorrhoids, had prior surgical hemorrhoidectomy which was very painful for him and he wishes to not repeat the procedure.  Denies any melena.  No nausea or vomiting.  He is currently getting IV iron.  Hgb 8.2, MCV 64.7.      Endo History:  2017 EGD/colonoscopy -hemorrhoids, anal fissure    Past Medical History:  Past Medical History:   Diagnosis Date   • Addiction, opium (HCC)     On Suboxone   • Anxiety    • Arthritis    • Blood in semen    • Colon polyp    • Hypertension    • Migraine    • Seasonal allergies        Past Surgical History:  Past Surgical History:   Procedure Laterality Date   • ANAL FISSURECTOMY     • COLONOSCOPY N/A 5/10/2017    Procedure: COLONOSCOPY TO CECUM & T.I. ;  Surgeon: Truman FRIEND MD;  Location: [facility] ENDOSCOPY;  Service:    • ENDOSCOPY  5/10/2017    Procedure: ESOPHAGOGASTRODUODENOSCOPY WITH COLD BIOPSIES;  Surgeon: Truman FRIEND MD;  Location: [facility] ENDOSCOPY;  Service:        Social History:  Social History     Tobacco Use   • Smoking status: Never     Passive exposure: Never   • Smokeless tobacco: Never   Vaping Use   • Vaping Use: Never used   Substance Use Topics   • Alcohol use: No   • Drug use: Not Currently     Types: Opium     Comment: On Suboxone       Family History:  Family History   Problem Relation Age of Onset    • No Known Problems Mother    • Malig Hyperthermia Neg Hx        Medications:  Medications Prior to Admission   Medication Sig Dispense Refill Last Dose   • cetirizine (zyrTEC) 10 MG tablet Take 1 tablet by mouth Daily. 90 tablet 0    • fluticasone (FLONASE) 50 MCG/ACT nasal spray 2 sprays into the nostril(s) as directed by provider Daily for 10 days. 11.1 mL 0    • fluticasone (FLONASE) 50 MCG/ACT nasal spray 2 sprays into the nostril(s) as directed by provider Daily. 18.2 mL 0    • hydrOXYzine (ATARAX) 25 MG tablet Take 25 mg by mouth 2 (Two) Times a Day As Needed.      • HYDROXYZINE HCL PO Take  by mouth.      • SUBOXONE 8-2 MG film film       • SUMAtriptan (IMITREX) 50 MG tablet Take 1 tablet by mouth Every 2 (Two) Hours As Needed for Migraine. Take 1po at onset of headache. May repeat dose one time in 2 hours if headache not relieved. 9 tablet 0    • lisinopril (PRINIVIL,ZESTRIL) 10 MG tablet  (Patient not taking: Reported on 2/17/2023)  0 Not Taking       Scheduled Meds:ferric gluconate, 250 mg, Intravenous, Daily   And  acetaminophen, 650 mg, Oral, Daily   And  diphenhydrAMINE, 25 mg, Intravenous, Daily  cetirizine, 10 mg, Oral, Daily  famotidine, 20 mg, Intravenous, Q12H  fluticasone, 2 spray, Nasal, Daily  levoFLOXacin, 750 mg, Intravenous, Q24H  lisinopril, 10 mg, Oral, Q24H  metroNIDAZOLE, 500 mg, Intravenous, Q8H  senna-docusate sodium, 2 tablet, Oral, BID  sodium chloride, 10 mL, Intravenous, Q12H      Continuous Infusions:sodium chloride, 100 mL/hr, Last Rate: 100 mL/hr (02/17/23 1387)      PRN Meds:.•  acetaminophen **OR** acetaminophen **OR** acetaminophen  •  senna-docusate sodium **AND** polyethylene glycol **AND** bisacodyl **AND** bisacodyl  •  HYDROcodone-acetaminophen  •  HYDROmorphone **AND** naloxone  •  hydrOXYzine  •  LORazepam  •  melatonin  •  ondansetron **OR** ondansetron  •  sodium chloride  •  sodium chloride  •  sodium chloride  •  SUMAtriptan    ALLERGIES:  Penicillins and Sudafed  [pseudoephedrine]    Review of Systems:  Review of Systems   Constitutional: Negative.    HENT: Negative.    Respiratory: Negative.    Cardiovascular: Negative.    Gastrointestinal: Positive for abdominal pain and blood in stool.   Genitourinary: Negative.    Musculoskeletal: Negative.    Skin: Negative.    Neurological: Negative.    Hematological: Negative.    Psychiatric/Behavioral: Negative.          Objective     Vital Signs:   Vitals:    02/18/23 1245 02/18/23 1300 02/18/23 1315 02/18/23 1416   BP: 155/67 150/77 140/82 144/77   BP Location:    Right arm   Patient Position:    Lying   Pulse: (!) 47 58 52 62   Resp: 18 18 18    Temp:    98.8 °F (37.1 °C)   TempSrc:    Oral   SpO2: 100% 100% 100% 100%   Weight:       Height:           Physical Exam: Resting in bed    General Appearance:    Awake and alert, in no acute distress   Head:    Normocephalic, without obvious abnormality   Throat:   No oral lesions, no thrush, oral mucosa moist   Lungs:     Respirations regular, even and unlabored   Chest Wall:    No abnormalities observed   Abdomen:     Soft, tender at incision points, non-distended, no rebound or guarding, no hepatosplenomegaly   Rectal:     Deferred   Extremities:   Moves all extremities, no edema, no cyanosis   Pulses:   Pulses palpable and equal bilaterally   Skin:   No bruising, no rash, no jaundice, normal palpation   Lymph nodes:   No cervical, supraclavicular or submandibular palpable adenopathy   Neurologic:   No asterixis       Results Review:  I reviewed the patient's labs and imaging.     CBC  Results from last 7 days   Lab Units 02/18/23  0715 02/17/23  1839   RBC 10*6/mm3 4.53 5.13   WBC 10*3/mm3 6.33 6.13   HEMOGLOBIN g/dL 8.2* 9.3*   PLATELETS 10*3/mm3 270 319       CMP  Results from last 7 days   Lab Units 02/18/23  0715 02/17/23  1839   SODIUM mmol/L 139 138   POTASSIUM mmol/L 4.1 3.5   CHLORIDE mmol/L 103 102   CO2 mmol/L 25.6 27.0   BUN mg/dL 14 14   CREATININE mg/dL 0.76 0.98    GLUCOSE mg/dL 109* 117*   ALBUMIN g/dL 4.0 4.7   BILIRUBIN mg/dL 0.4 0.4   ALK PHOS U/L 87 110   AST (SGOT) U/L 13 15   ALT (SGPT) U/L 16 16       Amylase and Lipase  Results from last 7 days   Lab Units 02/17/23  1839   LIPASE U/L 7*       CRP         Imaging Results (Last 24 Hours)     Procedure Component Value Units Date/Time    FL Cholangiogram Operative [[id number]] Resulted: 02/18/23 1156     Updated: 02/18/23 1157    US Gallbladder [[id number]] Collected: 02/17/23 2012     Updated: 02/17/23 2017    Narrative:      Examination: Gallbladder sonogram     HISTORY: 47-year-old male with a history of postprandial right upper  quadrant pain     FINDINGS: Sonographic evaluation of the structures of the right upper  quadrant was performed. Comparison is made to a CT of the abdomen and  pelvis without contrast dated 06/13/2016. The right kidney appears  normal. The liver has a normal appearance. The gallbladder is  demonstrates multiple internal gallstones. The gallbladder wall measures  3 mm in thickness. The common bile duct measures 0.3 cm in diameter. The  pancreas is poorly visualized. Per the sonographer, the patient was  tender over the right upper quadrant during the examination.       Impression:      1. There is cholelithiasis with findings suspicious for acute  cholecystitis. Surgical evaluation is recommended.     This was communicated to Dr. Aidan Hart in the emergency room on  02/17/2023 at 8:08 PM.     This report was finalized on 2/17/2023 8:14 PM by Dr. Rubin Monsalve M.D.               ASSESSMENT:  47-year-old male with -   -Possible choledocholithiasis -S/P ERCP 2/18, LFTs WNL  -Abdominal pain -secondary to postoperative status  -Chronic iron deficiency microcytic anemia -attributed to bleeding hemorrhoids  -History of hemorrhoids -s/p hemorrhoidectomy  -History of narcotic abuse - rx Suboxone        PLAN:  IOC images were reviewed by Dr. Marin and were unclear regarding possible  choledocholithiasis.  Liver enzymes are normal.  We will ask Dr. Waldron to review images as well. Consider MRCP or ERCP tomorrow.  Monitor liver enzymes.    Hematology following.  Getting IV iron.  Recommend outpatient repeat EGD/colonoscopy.    We appreciate the referral.    Jacey Levy, GIOVANNA  02/18/23  16:08 EST

## 2023-02-18 NOTE — PLAN OF CARE
Goal Outcome Evaluation:  Plan of Care Reviewed With: patient, mother, family        Progress: no change  Outcome Evaluation: Lap aguila today with 4 lap sites. Difficult with pain management r/t Suboxone use. GI consulted for poss ERCP. IV iron given. IV Flagyl q8hr.

## 2023-02-18 NOTE — H&P
Patient Name:  Emil Vo  YOB: 1975  MRN:  [id number]  Admit Date:  2/17/2023  Patient Care Team:  Provider, No Known as PCP - General        Chief Complaint   Patient presents with   • Abdominal Pain   On and off for 2 weeks.    History Present Illness     A pleasant 47 years old gentleman with a past history of allergic rhinitis/anxiety/hypertension/migraine who presents to the emergency department with right-sided upper abdominal pain that has been going on for 2 weeks radiating to the epigastrium associated with food intake.  Positive nausea and occasional vomiting.  Positive heartburn.  No dysphagia or odynophagia.  No diarrhea.  Positive occasional constipation and occasional fresh bright blood per rectum at the end of the bowel movement.  No fever or chills.  In the emergency department CBC was normal except a hemoglobin of 9.3.  Lipase normal.  CMP normal except a random blood sugar of 117.  UA was negative except for trace ketones and protein.  Gallbladder ultrasound revealed gallstone disease and acute cholecystitis.  Patient was subsequently admitted.        Review of Systems   Cardiovascular/respiratory.  No chest pain/no shortness of breath/no cough/no wheeze/no hemoptysis.  Positive occasional palpitation and mild swelling of both lower extremities.  .  No dysuria or hematuria.  No frequency or urgency  CNS.  No headache/no loss of consciousness/no focal neurological symptoms.    Personal History     Past Medical History:   Diagnosis Date   • Anxiety    • Arthritis    • Blood in semen    • Colon polyp    • Hypertension    • Migraine    • Seasonal allergies      Past Surgical History:   Procedure Laterality Date   • ANAL FISSURECTOMY     • COLONOSCOPY N/A 5/10/2017    Procedure: COLONOSCOPY TO CECUM & T.I. ;  Surgeon: Truman FRIEND MD;  Location: [facility] ENDOSCOPY;  Service:    • ENDOSCOPY  5/10/2017    Procedure: ESOPHAGOGASTRODUODENOSCOPY WITH COLD BIOPSIES;   Surgeon: Truman FRIEND MD;  Location: [facility] ENDOSCOPY;  Service:      Family History   Problem Relation Age of Onset   • No Known Problems Mother      Social History     Tobacco Use   • Smoking status: Never   • Smokeless tobacco: Never   Vaping Use   • Vaping Use: Never used   Substance Use Topics   • Alcohol use: No   • Drug use: No     No current facility-administered medications on file prior to encounter.     Current Outpatient Medications on File Prior to Encounter   Medication Sig Dispense Refill   • cetirizine (zyrTEC) 10 MG tablet Take 1 tablet by mouth Daily. 90 tablet 0   • fluticasone (FLONASE) 50 MCG/ACT nasal spray 2 sprays into the nostril(s) as directed by provider Daily for 10 days. 11.1 mL 0   • fluticasone (FLONASE) 50 MCG/ACT nasal spray 2 sprays into the nostril(s) as directed by provider Daily. 18.2 mL 0   • hydrOXYzine (ATARAX) 25 MG tablet Take 25 mg by mouth 2 (Two) Times a Day As Needed.     • HYDROXYZINE HCL PO Take  by mouth.     • SUBOXONE 8-2 MG film film      • SUMAtriptan (IMITREX) 50 MG tablet Take 1 tablet by mouth Every 2 (Two) Hours As Needed for Migraine. Take 1po at onset of headache. May repeat dose one time in 2 hours if headache not relieved. 9 tablet 0   • lisinopril (PRINIVIL,ZESTRIL) 10 MG tablet   0     Allergies   Allergen Reactions   • Penicillins Anaphylaxis   • Sudafed [Pseudoephedrine] Other (See Comments)     Unable to urinate       Objective    Objective     Vital Signs  Temp:  [98.5 °F (36.9 °C)] 98.5 °F (36.9 °C)  Heart Rate:  [54-80] 55  Resp:  [16] 16  BP: (117-148)/(63-81) 131/73  SpO2:  [96 %-100 %] 96 %  on   ;   Device (Oxygen Therapy): room air  Body mass index is 31.57 kg/m².    Physical Exam  General.  Middle-aged gentleman.  Alert and oriented x3.  No apparent pain/distress/diaphoresis.  Normal mood and affect.  Eyes.  Pupils equal round and reactive.  Intact extraocular musculature.  No pallor or jaundice.  Normal conjunctivae and lids.  Oral  cavity.  Moist mucous membrane with no lesions.  Neck.  Supple.  No JVD.  No lymphadenopathy or thyromegaly.  Cardiovascular.  Regular rate and rhythm and grade 2 systolic murmur.  Chest.  Clear to auscultation bilaterally with no added sounds.  Abdomen.  Soft lax.  No tenderness.  No organomegaly.  No guarding or rebound.  Extremities.  No clubbing or cyanosis.  Trace bilateral lower extremity edema.  CNS.  No acute focal neurological deficits.      Results Review:  I reviewed the patient's new clinical results.  I reviewed the patient's new imaging results and agree with the interpretation.  I reviewed the patient's other test results and agree with the interpretation  I personally viewed and interpreted the patient's EKG/Telemetry data  Discussed with ED provider.    Lab Results (last 24 hours)     Procedure Component Value Units Date/Time    CBC & Differential [[id number]]  (Abnormal) Collected: 02/17/23 1839    Specimen: Blood Updated: 02/17/23 1911    Narrative:      The following orders were created for panel order CBC & Differential.  Procedure                               Abnormality         Status                     ---------                               -----------         ------                     CBC Auto Differential[[id number]]        Abnormal            Final result                 Please view results for these tests on the individual orders.    Comprehensive Metabolic Panel [[id number]]  (Abnormal) Collected: 02/17/23 1839    Specimen: Blood Updated: 02/17/23 1924     Glucose 117 mg/dL      BUN 14 mg/dL      Creatinine 0.98 mg/dL      Sodium 138 mmol/L      Potassium 3.5 mmol/L      Chloride 102 mmol/L      CO2 27.0 mmol/L      Calcium 9.3 mg/dL      Total Protein 7.5 g/dL      Albumin 4.7 g/dL      ALT (SGPT) 16 U/L      AST (SGOT) 15 U/L      Alkaline Phosphatase 110 U/L      Total Bilirubin 0.4 mg/dL      Globulin 2.8 gm/dL      A/G Ratio 1.7 g/dL      BUN/Creatinine Ratio 14.3     Anion Gap 9.0  mmol/L      eGFR 96.3 mL/min/1.73     Narrative:      GFR Normal >60  Chronic Kidney Disease <60  Kidney Failure <15      Lipase [[id number]]  (Abnormal) Collected: 02/17/23 1839    Specimen: Blood Updated: 02/17/23 1924     Lipase 7 U/L     CBC Auto Differential [[id number]]  (Abnormal) Collected: 02/17/23 1839    Specimen: Blood Updated: 02/17/23 1911     WBC 6.13 10*3/mm3      RBC 5.13 10*6/mm3      Hemoglobin 9.3 g/dL      Hematocrit 32.4 %      MCV 63.2 fL      MCH 18.1 pg      MCHC 28.7 g/dL      RDW 16.6 %      RDW-SD 36.7 fl      MPV 9.8 fL      Platelets 319 10*3/mm3      nRBC 0.0 /100 WBC     Manual Differential [[id number]]  (Abnormal) Collected: 02/17/23 1839    Specimen: Blood Updated: 02/17/23 2015     Neutrophil % 82.8 %      Lymphocyte % 9.1 %      Monocyte % 5.1 %      Eosinophil % 1.0 %      Basophil % 2.0 %      Neutrophils Absolute 5.08 10*3/mm3      Lymphocytes Absolute 0.56 10*3/mm3      Monocytes Absolute 0.31 10*3/mm3      Eosinophils Absolute 0.06 10*3/mm3      Basophils Absolute 0.12 10*3/mm3      Anisocytosis Mod/2+     Hypochromia Mod/2+     Microcytes Mod/2+     Ovalocytes Slight/1+     Poikilocytes Slight/1+     Polychromasia Slight/1+     WBC Morphology Normal     Platelet Morphology Normal    Urinalysis With Microscopic If Indicated (No Culture) - Urine, Clean Catch [[id number]]  (Abnormal) Collected: 02/17/23 2008    Specimen: Urine, Clean Catch Updated: 02/17/23 2024     Color, UA Dark Yellow     Appearance, UA Clear     pH, UA 5.5     Specific Gravity, UA >=1.030     Glucose, UA Negative     Ketones, UA Trace     Bilirubin, UA Negative     Blood, UA Negative     Protein, UA Trace     Leuk Esterase, UA Negative     Nitrite, UA Negative     Urobilinogen, UA 0.2 E.U./dL    Narrative:      Urine microscopic not indicated.    Blood Culture - Blood, Arm, Right [[id number]] Collected: 02/17/23 2144    Specimen: Blood from Arm, Right Updated: 02/17/23 2154    Blood Culture - Blood, Arm, Left  [[id number]] Collected: 02/17/23 2207    Specimen: Blood from Arm, Left Updated: 02/17/23 2214    Lactic Acid, Plasma [[id number]] Collected: 02/17/23 2207    Specimen: Blood from Arm, Left Updated: 02/17/23 2215          Imaging Results (Last 24 Hours)     Procedure Component Value Units Date/Time    US Gallbladder [[id number]] Collected: 02/17/23 2012     Updated: 02/17/23 2017    Narrative:      Examination: Gallbladder sonogram     HISTORY: 47-year-old male with a history of postprandial right upper  quadrant pain     FINDINGS: Sonographic evaluation of the structures of the right upper  quadrant was performed. Comparison is made to a CT of the abdomen and  pelvis without contrast dated 06/13/2016. The right kidney appears  normal. The liver has a normal appearance. The gallbladder is  demonstrates multiple internal gallstones. The gallbladder wall measures  3 mm in thickness. The common bile duct measures 0.3 cm in diameter. The  pancreas is poorly visualized. Per the sonographer, the patient was  tender over the right upper quadrant during the examination.       Impression:      1. There is cholelithiasis with findings suspicious for acute  cholecystitis. Surgical evaluation is recommended.     This was communicated to Dr. Aidan Hart in the emergency room on  02/17/2023 at 8:08 PM.     This report was finalized on 2/17/2023 8:14 PM by Dr. Rubin Monsalve M.D.                 No orders to display            Assessment/Plan     Active Hospital Problems    Diagnosis  POA   • **Cholecystitis [K81.9]  Yes   • Cholelithiasis [K80.20]  Yes   • Essential hypertension [I10]  Yes   • Anemia [D64.9]  Yes   • Generalized anxiety disorder [F41.1]  Yes   • Allergic rhinitis [J30.9]  Yes   • Migraine [G43.909]  Yes      Resolved Hospital Problems   No resolved problems to display.           · Acute calculus cholecystitis.  Plan clear liquids.  N.p.o. after midnight.  IV fluid.  IV Dilaudid.  IV Pepcid.  IV Zofran.  IV  Levaquin and Flagyl.  Surgical consult.  Check blood cultures.  · Hypertension.  Good blood pressure control with no evidence of angina or congestive heart failure on lisinopril.  Positive occasional palpitation.  Will check EKG.  Continue lisinopril.  Monitor CMP.  · Anemia.  Baseline hemoglobin at 10.9.  Hemoglobin now worse than the baseline.  Will work-up the anemia.  Hematology oncology consulted in the ER.  Monitor CBC  · Anxiety.  Continue as needed Atarax.  · Migraine.  Negative CNS examination.  Continue as needed Imitrex.  · Allergic rhinitis.  Stable.  Negative wrist chest examination.  Continue Zyrtec and Flonase.  · VTE prophylaxis with sequential compression devices.    Discussed my findings and plan of treatment with the patient/ER provider.  .  Code Status -full code..       Wagner Grullon MD  [facility]  02/17/23  22:17 EST

## 2023-02-18 NOTE — OP NOTE
Operative Note :   Cristi Au MD    Emil Estevezatt  1975    Procedure Date: 02/18/23    Pre-op Diagnosis:  Calculus of gallbladder with chronic cholecystitis without obstruction [K80.10]    Post-Op Diagnosis:  Same    Procedure:   · Laparoscopic cholecystectomy with da Farrukh robot assistance and operative cholangiogram    Surgeon: Cristi Au MD    Assistant: Beatriz Maldonado CSA was responsible for performing the following activities: Irrigation, suction, retraction, manipulation of the camera, closure of skin and placement of dressings as well as exchange of instruments at bedside and their skilled assistance was necessary for the success of this case.    Anesthesia:  General    EBL:   minimal    Specimens:   Gallbladder and contents    Indications:  · 47-year-old gentleman with acute gallbladder symptoms and ultrasound demonstrating extensive stone burden    Findings:   · Extensive small stones within the gallbladder  · Operative cholangiogram with possible nonobstructing filling defect in the distal bile duct    Recommendations:   · I will ask GI to evaluate whether or not ERCP would be beneficial    Description of procedure:    After obtaining informed consent, patient was brought to the operating room and placed under a general anesthetic.  The abdomen was sterilely prepped and draped.  Perez's point was identified and anesthetized with a Marcaine solution.  8 mm skin incision made and then 0 degree scope and Optiview trocar was used with direct access technique, placed through the abdominal wall layers into the abdominal cavity.  The abdomen was then insufflated and inspection of the abdomen demonstrated no evidence of intra-abdominal injury.  Additional 8 mm robotic trocars were then introduced into the abdomen with an 8 mm right lateral trocar, 8 mm right mid abdomen trocar and 8 mm left midabdomen trocar.  The patient was positioned with the head up and right side up.  The da Farrukh Xi  robot was then brought up and docked.  From right to left the fenestrated bipolar grasper, 30 degree scope, monopolar hook and ProGrasp forceps were introduced under direct visualization.  I then moved to the console.  The fundus of the gallbladder was grasped and retracted cephalad.  Any adhesions to the fundus or infundibulum were taken down with a combination of sharp and blunt dissection, exposing the infundibulum.  The infundibulum was then grasped and retracted laterally.  The peritoneum overlying the cystic triangle was incised.  Next using a combination of sharp and blunt dissection and using fluorescent cholangiography, the cystic duct was dissected and identified completely.  The cystic artery was identified and dissected completely.  The remainder of the cystic triangle was cleared out, creating the retrocystic window and then the lower one third of the gallbladder was mobilized off the bed of the liver, giving the critical view of safety.  Again the fluoroscopic cholangiography was used to confirm impression of the anatomy.  2 clips were placed proximally on the cystic artery.  A clip was placed on the gallbladder side of the gallbladder cystic duct junction.  An incision was made in the cystic duct.  The 4 Setswana ureteral catheter was introduced through the 14-gauge angiocatheter in the patient's right upper quadrant and then inserted into the cystic duct and clipped into position.  We then shot our cholangiogram.  There was good filling of the distal bile duct.  Contrast was a bit slow to fill the duodenum but it did enter the duodenum.  It appeared that there might be some debris in the distal bile duct, but clearly no obstructing stone.  The proximal biliary radicles filled nicely.  The cystic duct was long.  There was a small defect in the cystic duct as well which I suspect was more likely an air bubble.  The catheter was withdrawn.  The cystic duct was then clipped and then divided between the  clips.  The cystic artery was divided between clips.  The gallbladder was removed from the bed of the liver with electrocautery and then placed in an Endo Catch bag.  The bed of the liver was inspected and any small bleeders were cauterized.  The clips on the cystic duct and cystic artery were inspected and appeared to be in good position.  The abdomen was irrigated.  Trocars were withdrawn under direct visualization after removal of the gallbladder.  The left mid abdominal trocar site skin incision was extended slightly in order to remove the gallbladder entirely.  The fascia at this site was reapproximated with 0 Vicryl.  Skin incisions were closed with 4-0 Monocryl.  Patient tolerated this well.    Cristi Au MD  General and Endoscopic Surgery  [facility], operated by [facility]    [address], [address]  [place], [place], [address]  P: [phone]

## 2023-02-18 NOTE — PLAN OF CARE
"Goal Outcome Evaluation:  Plan of Care Reviewed With: patient  Progress: no change  Outcome summary  Report recvd from Olivia JOSE, pt admitted from ER approx 2300. Pt welcomed and oriented to unit, questions, comments, concerns addressed. Pt c/o mild pain, PRN tylenol admin. IVF infusing. SCDs on, pt educated r/t rationale and vu. Order recvd for occult blood x1/stool, pt vu. Pt reports long hx of fissures and hemorrhoids which has caused \"chronic lower Hgb for years\", pt also reports prior surgeries for fissures and hemorrhoids. Pt up ad lou and agrees to notify staff of needs.   "

## 2023-02-18 NOTE — CONSULTS
Subjective     REASON FOR CONSULTATION: Anemia  Provide an opinion on any further workup or treatment                             REQUESTING PHYSICIAN: Madhu    RECORDS OBTAINED:  Records of the patients history including those obtained from the referring provider were reviewed and summarized in detail.    HISTORY OF PRESENT ILLNESS:  The patient is a 47 y.o. year old male who is here for an opinion about the above issue.    History of Present Illness   This is a 47-year-old man who presented to the ER on 2/17/2023 with complaints of right-sided upper abdominal pain for approximately 2 weeks ongoing typically associated with eating and accompanied by reflux, nausea and vomiting.  He has constipation and occasional BRBPR.  An ultrasound of the gallbladder showed cholelithiasis and thickening of the gallbladder wall suggestive of cholecystitis.    Hematology consulted for anemia-on presentation he has a significant microcytic, hypochromic anemia with hemoglobin 9.3/MCV 63.2/MCHC 28.7 with a normal white blood cell and platelet count.  Smear shows anisocytosis, poikilocytosis, microcytosis, hypochromia.  A prior CBC 6 years ago showed a hemoglobin of 10.9 with MCV 77.8.  Patient's ferritin is very low 4.2 and iron sat 3% with a TIBC 587.  Reticulocyte count is low 0.9%.  LDH is 146 and haptoglobin 145.    Patient states he has chronic bleeding hemorrhoids sometimes fairly significant.  He has intermittent constipation.  He has not been on oral iron recently.  He has had a colonoscopy in the past but has been since 2017.    Past Medical History:   Diagnosis Date   • Anxiety    • Arthritis    • Blood in semen    • Colon polyp    • Hypertension    • Migraine    • Seasonal allergies         Past Surgical History:   Procedure Laterality Date   • ANAL FISSURECTOMY     • COLONOSCOPY N/A 5/10/2017    Procedure: COLONOSCOPY TO CECUM & T.I. ;  Surgeon: Truman FRIEND MD;  Location: [facility] ENDOSCOPY;  Service:    •  ENDOSCOPY  5/10/2017    Procedure: ESOPHAGOGASTRODUODENOSCOPY WITH COLD BIOPSIES;  Surgeon: Truman FRIEND MD;  Location: [facility] ENDOSCOPY;  Service:         No current facility-administered medications on file prior to encounter.     Current Outpatient Medications on File Prior to Encounter   Medication Sig Dispense Refill   • cetirizine (zyrTEC) 10 MG tablet Take 1 tablet by mouth Daily. 90 tablet 0   • fluticasone (FLONASE) 50 MCG/ACT nasal spray 2 sprays into the nostril(s) as directed by provider Daily for 10 days. 11.1 mL 0   • fluticasone (FLONASE) 50 MCG/ACT nasal spray 2 sprays into the nostril(s) as directed by provider Daily. 18.2 mL 0   • hydrOXYzine (ATARAX) 25 MG tablet Take 25 mg by mouth 2 (Two) Times a Day As Needed.     • HYDROXYZINE HCL PO Take  by mouth.     • SUBOXONE 8-2 MG film film      • SUMAtriptan (IMITREX) 50 MG tablet Take 1 tablet by mouth Every 2 (Two) Hours As Needed for Migraine. Take 1po at onset of headache. May repeat dose one time in 2 hours if headache not relieved. 9 tablet 0   • lisinopril (PRINIVIL,ZESTRIL) 10 MG tablet  (Patient not taking: Reported on 2/17/2023)  0        ALLERGIES:    Allergies   Allergen Reactions   • Penicillins Anaphylaxis   • Sudafed [Pseudoephedrine] Other (See Comments)     Unable to urinate        Social History     Socioeconomic History   • Marital status: Single   Tobacco Use   • Smoking status: Never     Passive exposure: Never   • Smokeless tobacco: Never   Vaping Use   • Vaping Use: Never used   Substance and Sexual Activity   • Alcohol use: No   • Drug use: No   • Sexual activity: Defer        Family History   Problem Relation Age of Onset   • No Known Problems Mother         Review of Systems   Constitutional: Negative.    HENT: Negative.    Respiratory: Negative.    Cardiovascular: Negative.    Gastrointestinal: Positive for abdominal pain and blood in stool.   Genitourinary: Negative.    Musculoskeletal: Negative.    Skin: Negative.   "  Neurological: Negative.    Hematological: Negative.    Psychiatric/Behavioral: Negative.         Objective     Vitals:    02/17/23 2211 02/17/23 2225 02/17/23 2251 02/18/23 0337   BP: 131/72  119/71 116/75   BP Location:   Left arm Left arm   Patient Position:   Sitting Sitting   Pulse: 59 54 57 54   Resp:   18 16   Temp:   98.8 °F (37.1 °C) 98.4 °F (36.9 °C)   TempSrc:   Oral Oral   SpO2: 97% 97% 100% 99%   Weight:   96.9 kg (213 lb 10 oz)    Height:   177.8 cm (70\")      No flowsheet data found.    Physical Exam    CONSTITUTIONAL: pleasant well-developed adult man  HEENT: no icterus, no thrush, moist membranes  LYMPH: no cervical or supraclavicular lad  CV: RRR, S1S2, no murmur  RESP: cta bilat, no wheezing, no rales  GI: Tender right upper quadrant, no splenomegaly, +bs  MUSC: no edema, normal gait  NEURO: alert and oriented x3, normal strength  PSYCH: normal mood and affect    RECENT LABS:  Hematology WBC   Date Value Ref Range Status   02/17/2023 6.13 3.40 - 10.80 10*3/mm3 Final     RBC   Date Value Ref Range Status   02/17/2023 5.13 4.14 - 5.80 10*6/mm3 Final     Hemoglobin   Date Value Ref Range Status   02/17/2023 9.3 (L) 13.0 - 17.7 g/dL Final     Hematocrit   Date Value Ref Range Status   02/17/2023 32.4 (L) 37.5 - 51.0 % Final     Platelets   Date Value Ref Range Status   02/17/2023 319 140 - 450 10*3/mm3 Final        Lab Results   Component Value Date    GLUCOSE 109 (H) 02/18/2023    BUN 14 02/18/2023    CREATININE 0.76 02/18/2023    EGFR 112.3 02/18/2023    BCR 18.4 02/18/2023    K 4.1 02/18/2023    CO2 25.6 02/18/2023    CALCIUM 8.5 (L) 02/18/2023    ALBUMIN 4.0 02/18/2023    AST 13 02/18/2023    ALT 16 02/18/2023     Gallbladder ultrasound  IMPRESSION:  1. There is cholelithiasis with findings suspicious for acute  cholecystitis. Surgical evaluation is recommended.    Assessment & Plan     *Microcytic, hypochromic anemia secondary to iron deficiency  · CBC 6 years previous showed a hemoglobin " 10.9/MCV 77.8  · Current hemoglobin 9.3/MCV 63.2, normal white blood cell/platelet count; smear with anisocytosis, hypochromia, poikilocytosis, polychromasia  · Ferritin 4.2, iron sat 3%/TIBC 587, , haptoglobin 145, retic 0.9%  · B12, folate, hemoglobin electrophoresis-pending  · Patient reports chronic bright red blood per rectum from bleeding hemorrhoids; last EGD and colonoscopy 2017    *Acute cholecystitis-going to surgery    Hematology plan/recommendations  · I recommended the patient IV iron replacement while admitted with Ferrlecit  · Given his significant iron deficiency anemia would recommend consideration of outpatient repeat EGD/colonoscopy once healed from his cholecystectomy  · Monitor CBC    Thank you for allowing me to participate in the care of this pleasant patient.

## 2023-02-19 ENCOUNTER — ON CAMPUS - OUTPATIENT (OUTPATIENT)
Dept: URBAN - METROPOLITAN AREA HOSPITAL 114 | Facility: HOSPITAL | Age: 48
End: 2023-02-19

## 2023-02-19 VITALS
DIASTOLIC BLOOD PRESSURE: 69 MMHG | SYSTOLIC BLOOD PRESSURE: 117 MMHG | OXYGEN SATURATION: 100 % | RESPIRATION RATE: 16 BRPM | WEIGHT: 213.63 LBS | TEMPERATURE: 97 F | HEART RATE: 58 BPM | BODY MASS INDEX: 30.58 KG/M2 | HEIGHT: 70 IN

## 2023-02-19 DIAGNOSIS — D64.9 ANEMIA, UNSPECIFIED: ICD-10-CM

## 2023-02-19 DIAGNOSIS — K80.20 CALCULUS OF GALLBLADDER WITHOUT CHOLECYSTITIS WITHOUT OBSTRU: ICD-10-CM

## 2023-02-19 DIAGNOSIS — K80.40 CALCULUS OF BILE DUCT WITH CHOLECYSTITIS, UNSPECIFIED, WITHO: ICD-10-CM

## 2023-02-19 DIAGNOSIS — I10 ESSENTIAL (PRIMARY) HYPERTENSION: ICD-10-CM

## 2023-02-19 LAB
ALBUMIN SERPL-MCNC: 3.7 G/DL (ref 3.5–5.2)
ALBUMIN/GLOB SERPL: 1.5 G/DL
ALP SERPL-CCNC: 83 U/L (ref 39–117)
ALT SERPL W P-5'-P-CCNC: 23 U/L (ref 1–41)
ANION GAP SERPL CALCULATED.3IONS-SCNC: 8.8 MMOL/L (ref 5–15)
ANISOCYTOSIS BLD QL: ABNORMAL
AST SERPL-CCNC: 31 U/L (ref 1–40)
BILIRUB SERPL-MCNC: 0.4 MG/DL (ref 0–1.2)
BUN SERPL-MCNC: 16 MG/DL (ref 6–20)
BUN/CREAT SERPL: 13.8 (ref 7–25)
BURR CELLS BLD QL SMEAR: ABNORMAL
CALCIUM SPEC-SCNC: 9.1 MG/DL (ref 8.6–10.5)
CHLORIDE SERPL-SCNC: 106 MMOL/L (ref 98–107)
CO2 SERPL-SCNC: 24.2 MMOL/L (ref 22–29)
CREAT SERPL-MCNC: 1.16 MG/DL (ref 0.76–1.27)
DEPRECATED RDW RBC AUTO: 36.5 FL (ref 37–54)
EGFRCR SERPLBLD CKD-EPI 2021: 78.7 ML/MIN/1.73
ERYTHROCYTE [DISTWIDTH] IN BLOOD BY AUTOMATED COUNT: 17.4 % (ref 12.3–15.4)
GLOBULIN UR ELPH-MCNC: 2.5 GM/DL
GLUCOSE SERPL-MCNC: 124 MG/DL (ref 65–99)
HCT VFR BLD AUTO: 33.5 % (ref 37.5–51)
HGB BLD-MCNC: 9.7 G/DL (ref 13–17.7)
LYMPHOCYTES # BLD MANUAL: 1.27 10*3/MM3 (ref 0.7–3.1)
LYMPHOCYTES NFR BLD MANUAL: 7.4 % (ref 5–12)
MCH RBC QN AUTO: 18.1 PG (ref 26.6–33)
MCHC RBC AUTO-ENTMCNC: 29 G/DL (ref 31.5–35.7)
MCV RBC AUTO: 62.6 FL (ref 79–97)
MICROCYTES BLD QL: ABNORMAL
MONOCYTES # BLD: 0.99 10*3/MM3 (ref 0.1–0.9)
NEUTROPHILS # BLD AUTO: 11.13 10*3/MM3 (ref 1.7–7)
NEUTROPHILS NFR BLD MANUAL: 83.2 % (ref 42.7–76)
PLAT MORPH BLD: NORMAL
PLATELET # BLD AUTO: 376 10*3/MM3 (ref 140–450)
PMV BLD AUTO: 10.2 FL (ref 6–12)
POIKILOCYTOSIS BLD QL SMEAR: ABNORMAL
POTASSIUM SERPL-SCNC: 4.3 MMOL/L (ref 3.5–5.2)
PROT SERPL-MCNC: 6.2 G/DL (ref 6–8.5)
QT INTERVAL: 436 MS
RBC # BLD AUTO: 5.35 10*6/MM3 (ref 4.14–5.8)
SODIUM SERPL-SCNC: 139 MMOL/L (ref 136–145)
VARIANT LYMPHS NFR BLD MANUAL: 9.5 % (ref 19.6–45.3)
WBC MORPH BLD: NORMAL
WBC NRBC COR # BLD: 13.38 10*3/MM3 (ref 3.4–10.8)

## 2023-02-19 PROCEDURE — 80053 COMPREHEN METABOLIC PANEL: CPT | Performed by: SURGERY

## 2023-02-19 PROCEDURE — 25010000002 NA FERRIC GLUC CPLX PER 12.5 MG: Performed by: SURGERY

## 2023-02-19 PROCEDURE — 85007 BL SMEAR W/DIFF WBC COUNT: CPT | Performed by: SURGERY

## 2023-02-19 PROCEDURE — 85025 COMPLETE CBC W/AUTO DIFF WBC: CPT | Performed by: SURGERY

## 2023-02-19 PROCEDURE — 93010 ELECTROCARDIOGRAM REPORT: CPT | Performed by: INTERNAL MEDICINE

## 2023-02-19 PROCEDURE — 25010000002 KETOROLAC TROMETHAMINE PER 15 MG: Performed by: HOSPITALIST

## 2023-02-19 PROCEDURE — 25010000002 LEVOFLOXACIN PER 250 MG: Performed by: SURGERY

## 2023-02-19 PROCEDURE — 93005 ELECTROCARDIOGRAM TRACING: CPT | Performed by: NURSE PRACTITIONER

## 2023-02-19 PROCEDURE — 25010000002 ONDANSETRON PER 1 MG: Performed by: SURGERY

## 2023-02-19 PROCEDURE — G0378 HOSPITAL OBSERVATION PER HR: HCPCS

## 2023-02-19 PROCEDURE — 99213 OFFICE O/P EST LOW 20 MIN: CPT | Performed by: INTERNAL MEDICINE

## 2023-02-19 PROCEDURE — 25010000002 MORPHINE PER 10 MG: Performed by: NURSE PRACTITIONER

## 2023-02-19 PROCEDURE — 99232 SBSQ HOSP IP/OBS MODERATE 35: CPT | Performed by: INTERNAL MEDICINE

## 2023-02-19 PROCEDURE — 99024 POSTOP FOLLOW-UP VISIT: CPT | Performed by: SURGERY

## 2023-02-19 RX ORDER — LEVOFLOXACIN 5 MG/ML
INJECTION, SOLUTION INTRAVENOUS
Qty: 3 ML | Refills: 0 | OUTPATIENT
Start: 2023-02-19 | End: 2023-03-23

## 2023-02-19 RX ORDER — METRONIDAZOLE 500 MG/1
500 TABLET ORAL 3 TIMES DAILY
Qty: 9 TABLET | Refills: 0 | OUTPATIENT
Start: 2023-02-19 | End: 2023-03-23

## 2023-02-19 RX ORDER — MORPHINE SULFATE 2 MG/ML
2 INJECTION, SOLUTION INTRAMUSCULAR; INTRAVENOUS
Status: DISCONTINUED | OUTPATIENT
Start: 2023-02-19 | End: 2023-02-19 | Stop reason: HOSPADM

## 2023-02-19 RX ADMIN — MORPHINE SULFATE 2 MG: 2 INJECTION, SOLUTION INTRAMUSCULAR; INTRAVENOUS at 09:10

## 2023-02-19 RX ADMIN — METRONIDAZOLE 500 MG: 500 INJECTION, SOLUTION INTRAVENOUS at 05:32

## 2023-02-19 RX ADMIN — KETOROLAC TROMETHAMINE 30 MG: 30 INJECTION, SOLUTION INTRAMUSCULAR; INTRAVENOUS at 03:17

## 2023-02-19 RX ADMIN — MORPHINE SULFATE 2 MG: 2 INJECTION, SOLUTION INTRAMUSCULAR; INTRAVENOUS at 05:32

## 2023-02-19 RX ADMIN — FLUTICASONE PROPIONATE 2 SPRAY: 50 SPRAY, METERED NASAL at 09:08

## 2023-02-19 RX ADMIN — ACETAMINOPHEN 650 MG: 325 TABLET, FILM COATED ORAL at 16:20

## 2023-02-19 RX ADMIN — SODIUM CHLORIDE 100 ML/HR: 9 INJECTION, SOLUTION INTRAVENOUS at 03:25

## 2023-02-19 RX ADMIN — KETOROLAC TROMETHAMINE 30 MG: 30 INJECTION, SOLUTION INTRAMUSCULAR; INTRAVENOUS at 09:08

## 2023-02-19 RX ADMIN — SODIUM CHLORIDE 250 MG: 9 INJECTION, SOLUTION INTRAVENOUS at 16:20

## 2023-02-19 RX ADMIN — ONDANSETRON 4 MG: 2 INJECTION INTRAMUSCULAR; INTRAVENOUS at 01:40

## 2023-02-19 RX ADMIN — FAMOTIDINE 20 MG: 10 INJECTION INTRAVENOUS at 09:08

## 2023-02-19 RX ADMIN — MORPHINE SULFATE 2 MG: 2 INJECTION, SOLUTION INTRAMUSCULAR; INTRAVENOUS at 01:39

## 2023-02-19 RX ADMIN — LEVOFLOXACIN 750 MG: 5 INJECTION, SOLUTION INTRAVENOUS at 00:47

## 2023-02-19 NOTE — PROGRESS NOTES
Postop day 1 robotic cholecystectomy    Subjective:  Significant pain and nausea last night, improved this morning and actually wants to eat but is awaiting GI input.  Pain is improved.    Objective:  Afebrile, vital stable  General: Awake and alert without distress  Eyes: No icterus  Abdomen: Soft, appropriate tenderness, incisions okay    Labs reviewed, essentially unremarkable, hemoglobin 9.7 which is actually improved, postop leukocytosis expected, liver function studies normal, specifically total bilirubin 0.4.    Assessment and plan:  -Cholecystitis and cholelithiasis, status postcholecystectomy, recovering well  -Possible small distal common bile duct stone, GI to see to consider whether ERCP needed, may be small enough that it would pass on its own and bilirubin is normal.  Okay for diet from my standpoint if no plans for ERCP and as far as I am concerned to go home when pain is controlled and able to tolerate a diet.  -Would defer further endoscopy to GI service as well  -Follow-up with me in the office 2 weeks  -Conservative measures for management of hemorrhoids    Cristi Au MD  General and Endoscopic Surgery  [facility] ([facility]    [address], [address]  [place], [place], [address]  P: [phone]  F: [phone]

## 2023-02-19 NOTE — PROGRESS NOTES
Reason for follow-up: Iron deficiency anemia    Interval history:  The patient underwent a laparoscopic cholecystectomy and cholangiogram on 2/18/2023.  There was a possible nonobstructing filling defect distal bile duct.  He received IV iron with no problem.  Hemoglobin stable 9.5.        Past Medical History, Past Surgical History, Social History, Family History have been reviewed and are without significant changes except as mentioned.    Review of Systems   Constitutional: Positive for appetite change and fatigue.   Gastrointestinal: Positive for blood in stool and nausea.   Musculoskeletal: Negative.    Skin: Negative.         Medications:  The current medication list was reviewed in the EMR    ALLERGIES:    Allergies   Allergen Reactions   • Penicillins Anaphylaxis   • Sudafed [Pseudoephedrine] Other (See Comments)     Unable to urinate       Objective      Vitals:    02/19/23 0944   BP: 126/72   Pulse: 59   Resp: 16   Temp: 97.7 °F (36.5 °C)   SpO2: 99%          Physical Exam    CONSTITUTIONAL: pleasant well-developed adult man sitting up edge of bed  CV: RRR, S1S2, no murmur  RESP: cta bilat, no wheezing, no rales  GI: soft, appropriately tender postop no splenomegaly, +bs  MUSC: no edema, normal gait  NEURO: alert and oriented x3, normal strength  PSYCH: normal mood    RECENT LABS:  Hematology Results from last 7 days   Lab Units 02/19/23  0722 02/18/23  0715 02/17/23  1839   WBC 10*3/mm3 13.38* 6.33 6.13   HEMOGLOBIN g/dL 9.7* 8.2* 9.3*   HEMATOCRIT % 33.5* 29.3* 32.4*   PLATELETS 10*3/mm3 376 270 319     2  Lab Results   Component Value Date    GLUCOSE 124 (H) 02/19/2023    BUN 16 02/19/2023    CREATININE 1.16 02/19/2023    EGFRIFNONA 79 06/13/2016    BCR 13.8 02/19/2023    CO2 24.2 02/19/2023    CALCIUM 9.1 02/19/2023    ALBUMIN 3.7 02/19/2023    AST 31 02/19/2023    ALT 23 02/19/2023       Lab Results   Component Value Date    IRON 18 (L) 02/17/2023    TIBC 587 (H) 02/17/2023    FERRITIN 4.24 (L)  02/17/2023       Lab Results   Component Value Date    [id number] 403 02/17/2023       Lab Results   Component Value Date    FOLATE 12.50 02/17/2023          Assessment & Plan   *Microcytic, hypochromic anemia secondary to iron deficiency  • CBC 6 years previous showed a hemoglobin 10.9/MCV 77.8  • Current hemoglobin 9.3/MCV 63.2, normal white blood cell/platelet count; smear with anisocytosis, hypochromia, poikilocytosis, polychromasia  • Ferritin 4.2, iron sat 3%/TIBC 587, , haptoglobin 145, retic 0.9%  • B12, folate, hemoglobin electrophoresis-pending  • Patient reports chronic bright red blood per rectum from bleeding hemorrhoids; last EGD and colonoscopy 2017  • Initiated IV iron with Ferrlecit 2/18/2023-tolerating well     *Acute cholecystitis- status post laparoscopic cholecystectomy     Hematology plan/recommendations  • I recommended the patient IV iron replacement while admitted with Ferrlecit  • Given his significant iron deficiency anemia would recommend consideration of outpatient repeat EGD/colonoscopy once healed from his cholecystectomy  • Monitor CBC  • I or NP will see the patient back in clinic 2 to 3 weeks with a repeat CBC.  Please call if further needed during the hospital stay.                     2/19/2023      CC:

## 2023-02-19 NOTE — PLAN OF CARE
Goal Outcome Evaluation:  Plan of Care Reviewed With: patient  Progress: no change  Outcome summary  Pt with c/o abd pain & N, PRN meds admin & pt reports improved. Pt strict NPO after MN, on call provider notified for IV pain meds, morphine admin & pt reports pain improved. Pt also recv PRN Toradol IV and reports improvement. Pt tony up in chair for short while, no ambulation this shift. SCD refused pt educated and vu. 0315 pt with c/o sharp CP 5/10, pt lips pale, bradycardic, on call provider notified and stat EKG ordered, (see results), pt reports pain has gradually improved/subsided. IVF infusing. IV Abx. Pt appears to have had an uncomfortable NOC with intermittent sleep/rest, however pt does report N,and pain are improving with interventions.

## 2023-02-19 NOTE — DISCHARGE SUMMARY
PHYSICIAN DISCHARGE SUMMARY                                                                        [facility]    Patient Identification:  Name: Emil Vo  Age: 47 y.o.  Sex: male  :  1975  MRN: [id number]  Primary Care Physician: Provider, No Known    Admit date: 2023  Discharge date and time: .2023     Discharged Condition: good    Discharge Diagnoses:  Acute cholecystitis: Status post laparoscopic cholecystectomy.    Severe iron deficiency anemia:   History of narcotic abuse:   Hypertension:   Anxiety:   History of migraine headaches:       Hospital Course:  47-year-old gentleman presenting with right upper quadrant pain.  Work-up reveals acute cholecystitis.  He did undergo laparoscopic cholecystectomy.  He did well postop.  There is question as to whether there is a retained stone in the common duct.  Postop course however was quite smooth.  Complete normalization of the LFTs.  Patient is tolerating oral intake well.  ERCP does not appear to be necessary.  GI has cleared him for discharge as well as the other services.  He does also have severe iron deficiency anemia and during the hospitalization received 2 courses of ferric gluconate IV.  He will follow-up with hematology with this also      Consults:     Consults     Date and Time Order Name Status Description    2023  2:16 PM Inpatient Gastroenterology Consult Completed     2023  8:56 PM [facility] (on-call MD unless specified) Details      2023  8:28 PM Hematology and Oncology (on-call MD unless specified)      2023  7:57 PM Surgery (on-call MD unless specified) Completed             Discharge Exam:  General Appearance:  Comfortable, well-appearing, in no acute distress and not in pain.    Vital signs: (most recent): Blood pressure 126/72, pulse 59, temperature 97.7 °F (36.5 °C), temperature source Oral, resp. rate 16, height 177.8 cm  "(70\"), weight 96.9 kg (213 lb 10 oz), SpO2 99 %.    Lungs:  Normal effort and normal respiratory rate.  Breath sounds clear to auscultation.    Heart: Normal rate.  Regular rhythm.    Abdomen: Abdomen is soft.    Extremities: There is no dependent edema.    Neurological: Patient is alert and oriented to person, place and time.    Skin:  Warm and dry.       Disposition:  Home    Patient Instructions:      Discharge Medications      New Medications      Instructions Start Date   levoFLOXacin (LEVAQUIN) 750 mg/150 mL D5W (premix) 750 MG/150ML solution IVPB  Commonly known as: LEVAQUIN   1 pill daily for 3 days.      metroNIDAZOLE 500 MG tablet  Commonly known as: Flagyl   500 mg, Oral, 3 Times Daily         Changes to Medications      Instructions Start Date   fluticasone 50 MCG/ACT nasal spray  Commonly known as: FLONASE  What changed: Another medication with the same name was removed. Continue taking this medication, and follow the directions you see here.   2 sprays, Nasal, Daily         Continue These Medications      Instructions Start Date   cetirizine 10 MG tablet  Commonly known as: zyrTEC   10 mg, Oral, Daily      HYDROXYZINE HCL PO   Oral      hydrOXYzine 25 MG tablet  Commonly known as: ATARAX   25 mg, Oral, 2 Times Daily PRN      Suboxone 8-2 MG film film  Generic drug: buprenorphine-naloxone   No dose, route, or frequency recorded.      SUMAtriptan 50 MG tablet  Commonly known as: IMITREX   50 mg, Oral, Every 2 Hours PRN, Take 1po at onset of headache. May repeat dose one time in 2 hours if headache not relieved.         ASK your doctor about these medications      Instructions Start Date   lisinopril 10 MG tablet  Commonly known as: PRINIVIL,ZESTRIL   No dose, route, or frequency recorded.           Diet Instructions     Diet: Regular      Discharge Diet: Regular    Low-fat diet        No future appointments.  Additional Instructions for the Follow-ups that You Need to Schedule     Discharge Follow-up " with PCP   As directed       Currently Documented PCP:    Provider, No Known    PCP Phone Number:    [phone]     Follow Up Details: 1 week         Discharge Follow-up with Specialty: Surgery.  Gastroenterology.  Hematology.   As directed      Specialty: Surgery.  Gastroenterology.  Hematology.    Follow Up Details: As directed            Follow-up Information     Cristi Au MD Follow up in 2 week(s).    Specialty: General Surgery  Contact information:  [address]  [facility] [address]  [facility] [id number] [phone]             Provider, No Known .    Why: 1 week  Contact information:  [facility] [id number] [phone]                       Discharge Order (From admission, onward)     Start     Ordered    02/19/23 1803  Discharge patient  Once        Comments: Discharge after iron infusion complete.   Expected Discharge Date: 02/19/23    Discharge Disposition: Home or Self Care    Physician of Record for Attribution - Please select from Treatment Team: GREY MARSHALL [[id number]]    Review needed by CMO to determine Physician of Record: No       Question Answer Comment   Physician of Record for Attribution - Please select from Treatment Team GREY MARSHALL    Review needed by CMO to determine Physician of Record No        02/19/23 1811                  Total time spent discharging patient including evaluation,post hospitalization follow up,  medication and post hospitalization instructions and education total time exceeds 30 minutes.    Signed:  Grey Marshall MD  2/19/2023  18:11 EST    EMR Dragon/Transcription disclaimer:   Much of this encounter note is an electronic transcription/translation of spoken language to printed text. The electronic translation of spoken language may permit erroneous, or at times, nonsensical words or phrases to be inadvertently transcribed; Although I have reviewed the note for such errors, some may still exist.

## 2023-02-19 NOTE — PROGRESS NOTES
"DAILY PROGRESS NOTE  [facility]    Patient Identification:  Name: Emil Vo  Age: 47 y.o.  Sex: male  :  1975  MRN: [id number]         Primary Care Physician: Provider, No Known      Subjective  Patient with no acute complaints.  States he is feeling better today.  Anxious to resume oral intake.    Objective:  General Appearance:  Comfortable, well-appearing, in no acute distress and not in pain.    Vital signs: (most recent): Blood pressure 126/72, pulse 59, temperature 97.7 °F (36.5 °C), temperature source Oral, resp. rate 16, height 177.8 cm (70\"), weight 96.9 kg (213 lb 10 oz), SpO2 99 %.    Lungs:  Normal effort and normal respiratory rate.  Breath sounds clear to auscultation.    Heart: Normal rate.  Regular rhythm.    Abdomen: Abdomen is soft.    Extremities: There is no dependent edema.    Neurological: Patient is alert and oriented to person, place and time.    Skin:  Warm and dry.                Vital signs in last 24 hours:  Temp:  [96.8 °F (36 °C)-98.8 °F (37.1 °C)] 97.7 °F (36.5 °C)  Heart Rate:  [] 59  Resp:  [16-18] 16  BP: (126-155)/(67-92) 126/72    Intake/Output:    Intake/Output Summary (Last 24 hours) at 2023 1235  Last data filed at 2023 0944  Gross per 24 hour   Intake 2009 ml   Output 1250 ml   Net 759 ml         Results from last 7 days   Lab Units 23  0722 23  0715 23  1839   WBC 10*3/mm3 13.38* 6.33 6.13   HEMOGLOBIN g/dL 9.7* 8.2* 9.3*   PLATELETS 10*3/mm3 376 270 319     Results from last 7 days   Lab Units 23  0722 23  0715 23  1839   SODIUM mmol/L 139 139 138   POTASSIUM mmol/L 4.3 4.1 3.5   CHLORIDE mmol/L 106 103 102   CO2 mmol/L 24.2 25.6 27.0   BUN mg/dL 16 14 14   CREATININE mg/dL 1.16 0.76 0.98   GLUCOSE mg/dL 124* 109* 117*   Estimated Creatinine Clearance: 93 mL/min (by C-G formula based on SCr of 1.16 mg/dL).  Results from last 7 days   Lab Units 23  0722 23  0715 23  1839 "   CALCIUM mg/dL 9.1 8.5* 9.3   ALBUMIN g/dL 3.7 4.0 4.7     Results from last 7 days   Lab Units 02/19/23  0722 02/18/23  0715 02/17/23  1839   ALBUMIN g/dL 3.7 4.0 4.7   BILIRUBIN mg/dL 0.4 0.4 0.4   ALK PHOS U/L 83 87 110   AST (SGOT) U/L 31 13 15   ALT (SGPT) U/L 23 16 16       Assessment:  Acute cholecystitis: Status post laparoscopic cholecystectomy.  Surgical input greatly appreciated.  Postop LFTs normal.  Severe iron deficiency anemia: Monitor closely.  Ferric gluconate infusions initiated.  History of narcotic abuse: Minimize opioid use.  DC IV Dilaudid.  As needed Toradol.  Resume Suboxone when stable.  Hypertension: Continue home meds.  Anxiety: Continue home meds.  History of migraine headaches: Continue home meds.      Plan:  Please see above.  Medically stable.  Awaiting GI clearance.  Discussed with patient and son at bedside.  Discussed with REBECA Marshall MD  2/19/2023  12:35 EST

## 2023-02-19 NOTE — PROGRESS NOTES
"DAILY PROGRESS NOTE  [facility]    Patient Identification:  Name: Emil Vo  Age: 47 y.o.  Sex: male  :  1975  MRN: [id number]         Primary Care Physician: Provider, No Known      Subjective  Patient now postop.  Complains of nausea.    Objective:  General Appearance:  Well-appearing, in no acute distress and comfortable.    Vital signs: (most recent): Blood pressure 128/71, pulse (!) 125, temperature 97.4 °F (36.3 °C), temperature source Oral, resp. rate 16, height 177.8 cm (70\"), weight 96.9 kg (213 lb 10 oz), SpO2 100 %.    Lungs:  Normal effort and normal respiratory rate.  Breath sounds clear to auscultation.    Heart: Normal rate.  Regular rhythm.    Abdomen: Abdomen is soft and non-distended.  (Mild tenderness.  Appropriate for postop state.).  Hypoactive bowel sounds.   There is no rebound tenderness.  There is no guarding.     Extremities: There is no dependent edema.    Neurological: (Eyes closed but awake.  Reasonably cooperative with exam.).    Skin:  Warm and dry.                Vital signs in last 24 hours:  Temp:  [97.4 °F (36.3 °C)-98.8 °F (37.1 °C)] 97.4 °F (36.3 °C)  Heart Rate:  [] 125  Resp:  [16-18] 16  BP: (107-155)/(63-83) 128/71    Intake/Output:    Intake/Output Summary (Last 24 hours) at 2023 1919  Last data filed at 2023 1639  Gross per 24 hour   Intake 2121 ml   Output 615 ml   Net 1506 ml         Results from last 7 days   Lab Units 23  0715 23  1839   WBC 10*3/mm3 6.33 6.13   HEMOGLOBIN g/dL 8.2* 9.3*   PLATELETS 10*3/mm3 270 319     Results from last 7 days   Lab Units 23  0715 23  1839   SODIUM mmol/L 139 138   POTASSIUM mmol/L 4.1 3.5   CHLORIDE mmol/L 103 102   CO2 mmol/L 25.6 27.0   BUN mg/dL 14 14   CREATININE mg/dL 0.76 0.98   GLUCOSE mg/dL 109* 117*   Estimated Creatinine Clearance: 141.9 mL/min (by C-G formula based on SCr of 0.76 mg/dL).  Results from last 7 days   Lab Units 23  0715 " 02/17/23  1839   CALCIUM mg/dL 8.5* 9.3   ALBUMIN g/dL 4.0 4.7     Results from last 7 days   Lab Units 02/18/23  0715 02/17/23  1839   ALBUMIN g/dL 4.0 4.7   BILIRUBIN mg/dL 0.4 0.4   ALK PHOS U/L 87 110   AST (SGOT) U/L 13 15   ALT (SGPT) U/L 16 16       Assessment:  Acute cholecystitis: Status post laparoscopic cholecystectomy.  Surgical input greatly appreciated.  Severe iron deficiency anemia: Monitor closely.  Ferric gluconate infusions initiated.  Narcotic abuse: Minimize opioid use.  DC IV Dilaudid.  As needed Toradol.  Resume Suboxone when stable.  Hypertension: Continue home meds.  Anxiety: Continue home meds.  History of migraine headaches: Continue home meds.    All problems new to this examiner    Plan:  Please see above.  Discussed with patient and parents at bedside.    Grey Marshall MD  2/18/2023  19:19 EST

## 2023-02-19 NOTE — PROGRESS NOTES
LOS: 0 days   Patient Care Team:  Provider, No Known as PCP - General      Subjective     Interval History:     Subjective: No pain overnight.  No nausea vomiting.  Only has some soreness over incision sites      ROS:   No chest pain, shortness of breath, or cough.  No nausea vomiting or abdominal pain       Medication Review:     Current Facility-Administered Medications:   •  acetaminophen (TYLENOL) tablet 650 mg, 650 mg, Oral, Q4H PRN, 650 mg at 02/18/23 0530 **OR** acetaminophen (TYLENOL) 160 MG/5ML solution 650 mg, 650 mg, Oral, Q4H PRN **OR** acetaminophen (TYLENOL) suppository 650 mg, 650 mg, Rectal, Q4H PRN, Cristi Au MD  •  ferric gluconate (FERRLECIT) 250 mg in sodium chloride 0.9 % 120 mL IVPB, 250 mg, Intravenous, Daily, Stopped at 02/18/23 1724 **AND** acetaminophen (TYLENOL) tablet 650 mg, 650 mg, Oral, Daily, 650 mg at 02/18/23 1443 **AND** diphenhydrAMINE (BENADRYL) injection 25 mg, 25 mg, Intravenous, Daily, Cristi Au MD, 25 mg at 02/18/23 1443  •  sennosides-docusate (PERICOLACE) 8.6-50 MG per tablet 2 tablet, 2 tablet, Oral, BID, 2 tablet at 02/18/23 0802 **AND** polyethylene glycol (MIRALAX) packet 17 g, 17 g, Oral, Daily PRN **AND** bisacodyl (DULCOLAX) EC tablet 5 mg, 5 mg, Oral, Daily PRN **AND** bisacodyl (DULCOLAX) suppository 10 mg, 10 mg, Rectal, Daily PRN, Cristi Au MD  •  cetirizine (zyrTEC) tablet 10 mg, 10 mg, Oral, Daily, Cristi Au MD, 10 mg at 02/18/23 0802  •  famotidine (PEPCID) injection 20 mg, 20 mg, Intravenous, Q12H, Cristi Au MD, 20 mg at 02/19/23 0908  •  fluticasone (FLONASE) 50 MCG/ACT nasal spray 2 spray, 2 spray, Nasal, Daily, Cristi Au MD, 2 spray at 02/19/23 0908  •  HYDROcodone-acetaminophen (NORCO) 7.5-325 MG per tablet 1 tablet, 1 tablet, Oral, Q4H PRN, Cristi Au MD, 1 tablet at 02/18/23 0758  •  hydrOXYzine (ATARAX) tablet 25 mg, 25 mg, Oral, BID PRN, Cristi Au MD  •  ketorolac (TORADOL)  injection 30 mg, 30 mg, Intravenous, Q6H PRN, Grey Marshall MD, 30 mg at 02/19/23 0908  •  levoFLOXacin (LEVAQUIN) 750 mg/150 mL D5W (premix) (LEVAQUIN) 750 mg, 750 mg, Intravenous, Q24H, Cristi Au MD, Stopped at 02/19/23 0700  •  lisinopril (PRINIVIL,ZESTRIL) tablet 10 mg, 10 mg, Oral, Q24H, Cristi Au MD  •  LORazepam (ATIVAN) tablet 0.5 mg, 0.5 mg, Oral, Q8H PRN, Cristi Au MD  •  melatonin tablet 5 mg, 5 mg, Oral, Nightly PRN, Cristi Au MD  •  metroNIDAZOLE (FLAGYL) IVPB 500 mg, 500 mg, Intravenous, Q8H, Cristi Au MD, Stopped at 02/19/23 0700  •  morphine injection 2 mg, 2 mg, Intravenous, Q3H PRN, Justina Lenz APRN, 2 mg at 02/19/23 0910  •  [DISCONTINUED] HYDROmorphone (DILAUDID) injection 0.5 mg, 0.5 mg, Intravenous, Q2H PRN, 0.5 mg at 02/18/23 1724 **AND** naloxone (NARCAN) injection 0.4 mg, 0.4 mg, Intravenous, Q5 Min PRN, Cristi Au MD  •  ondansetron (ZOFRAN) tablet 4 mg, 4 mg, Oral, Q6H PRN **OR** ondansetron (ZOFRAN) injection 4 mg, 4 mg, Intravenous, Q6H PRN, Cristi Au MD, 4 mg at 02/19/23 0140  •  sodium chloride 0.9 % flush 10 mL, 10 mL, Intravenous, PRN, Cristi Au MD  •  sodium chloride 0.9 % flush 10 mL, 10 mL, Intravenous, Q12H, Cristi Au MD, 10 mL at 02/18/23 2121  •  sodium chloride 0.9 % flush 10 mL, 10 mL, Intravenous, PRN, Cristi Au MD  •  sodium chloride 0.9 % infusion 40 mL, 40 mL, Intravenous, PRN, Cristi Au MD  •  sodium chloride 0.9 % infusion, 100 mL/hr, Intravenous, Continuous, Cristi Au MD, Last Rate: 100 mL/hr at 02/19/23 0845, 100 mL/hr at 02/19/23 0845  •  SUMAtriptan (IMITREX) tablet 50 mg, 50 mg, Oral, Q2H PRN, Cristi Au MD      Objective     Vital Signs  Vitals:    02/19/23 0046 02/19/23 0415 02/19/23 0944 02/19/23 1256   BP: 139/91 144/81 126/72 117/69   BP Location: Right arm Right arm Left arm Left arm   Patient Position: Lying Lying Lying Lying    Pulse: 79 52 59 57   Resp: 16 16 16 16   Temp: 97.1 °F (36.2 °C) 97.6 °F (36.4 °C) 97.7 °F (36.5 °C) 97 °F (36.1 °C)   TempSrc: Oral Oral Oral Oral   SpO2: 97% 93% 99% 99%   Weight:       Height:         Physical Exam:    General Appearance:    Awake and alert, in no acute distress   Head:    Normocephalic, without obvious abnormality   Eyes:          Conjunctivae normal, anicteric sclera   Ears:    Hearing intact   Lungs:     respirations regular, even and unlabored   Abdomen:     soft, non-tender, no rebound or guarding, non-distended, no hepatosplenomegaly soreness over incision sites   Rectal:     Deferred   Extremities:   No edema, no cyanosis, no redness        Results Review:    Lab Results (last 24 hours)     Procedure Component Value Units Date/Time    Manual Differential [[id number]]  (Abnormal) Collected: 02/19/23 0722    Specimen: Blood Updated: 02/19/23 0843     Neutrophil % 83.2 %      Lymphocyte % 9.5 %      Monocyte % 7.4 %      Neutrophils Absolute 11.13 10*3/mm3      Lymphocytes Absolute 1.27 10*3/mm3      Monocytes Absolute 0.99 10*3/mm3      Anisocytosis Large/3+     Jeet Cells Mod/2+     Microcytes Large/3+     Poikilocytes Mod/2+     WBC Morphology Normal     Platelet Morphology Normal    CBC & Differential [[id number]]  (Abnormal) Collected: 02/19/23 0722    Specimen: Blood Updated: 02/19/23 0843    Narrative:      The following orders were created for panel order CBC & Differential.  Procedure                               Abnormality         Status                     ---------                               -----------         ------                     CBC Auto Differential[[id number]]        Abnormal            Final result                 Please view results for these tests on the individual orders.    CBC Auto Differential [[id number]]  (Abnormal) Collected: 02/19/23 0722    Specimen: Blood Updated: 02/19/23 0843     WBC 13.38 10*3/mm3      RBC 5.35 10*6/mm3      Hemoglobin 9.7 g/dL       Hematocrit 33.5 %      MCV 62.6 fL      MCH 18.1 pg      MCHC 29.0 g/dL      RDW 17.4 %      RDW-SD 36.5 fl      MPV 10.2 fL      Platelets 376 10*3/mm3     Comprehensive Metabolic Panel [[id number]]  (Abnormal) Collected: 02/19/23 0722    Specimen: Blood Updated: 02/19/23 0757     Glucose 124 mg/dL      BUN 16 mg/dL      Creatinine 1.16 mg/dL      Sodium 139 mmol/L      Potassium 4.3 mmol/L      Chloride 106 mmol/L      CO2 24.2 mmol/L      Calcium 9.1 mg/dL      Total Protein 6.2 g/dL      Albumin 3.7 g/dL      ALT (SGPT) 23 U/L      AST (SGOT) 31 U/L      Alkaline Phosphatase 83 U/L      Total Bilirubin 0.4 mg/dL      Globulin 2.5 gm/dL      A/G Ratio 1.5 g/dL      BUN/Creatinine Ratio 13.8     Anion Gap 8.8 mmol/L      eGFR 78.7 mL/min/1.73     Narrative:      GFR Normal >60  Chronic Kidney Disease <60  Kidney Failure <15      Blood Culture - Blood, Arm, Left [[id number]]  (Normal) Collected: 02/17/23 2207    Specimen: Blood from Arm, Left Updated: 02/18/23 2215     Blood Culture No growth at 24 hours    Blood Culture - Blood, Arm, Right [[id number]]  (Normal) Collected: 02/17/23 2144    Specimen: Blood from Arm, Right Updated: 02/18/23 2200     Blood Culture No growth at 24 hours          Imaging Results (Last 24 Hours)     Procedure Component Value Units Date/Time    FL Cholangiogram Operative [[id number]] Collected: 02/18/23 1918     Updated: 02/18/23 1945    Narrative:      FLUOROSCOPIC OPERATIVE CHOLANGIOGRAM     HISTORY: 47-year-old male undergoing intraoperative cholangiography.     FINDINGS: Multiple coned-down AP fluoroscopic images of the right upper  quadrant were obtained and demonstrate contrast opacification of the  biliary tree. There is a mobile filling defect within the cystic duct  remnant that is most consistent with an air bubble. Contrast  opacification of the intrahepatic and extrahepatic bile ducts is noted.  There is also a filling defect in the distal common bile duct near the  ampulla of  Vater. This may be artifactual although a small distal CBD  stone cannot be completely excluded.     FLUOROSCOPY TIME: Eighteen seconds, 116 images.     This report was finalized on 2/18/2023 7:42 PM by Dr. Rubin Monsalve M.D.               ASSESSMENT:        Choledocholithiasis?    Cholecystitis    Cholelithiasis    Essential hypertension    Anemia    Generalized anxiety disorder    Allergic rhinitis    Migraine    Impression:  -Had a long discussion with the patient discussing the risks of ERCP including post ERCP pancreatitis in 7 to 10% of people as well as bleeding, perforation, infection, and anesthesia risk.  Discussed that there is an area of questionable small stones/debris in the distal common bile duct, but his bilirubin and liver enzymes are normal and he is without pain.  There is a chance that I could perform ERCP and there be no stone or debris in the duct.  He elected on holding off on ERCP and attempting a diet.  If he tolerates his diet with no pain, we will hold off on ERCP.  He understands that there is a risk of gallstone pancreatitis if this is a stone.  I think this is a reasonable plan.    I personally reviewed the cholangiogram images and discussed the case with Dr. Marin    PLAN:  Advance diet to low-fat  If tolerates diet with no pain, can be discharged from a GI standpoint.  He understands the importance of presenting back to the hospital or calling the GI service if any right upper quadrant pain occurs after eating in the future.      Vasquez Waldron MD  02/19/23  13:13 EST

## 2023-02-20 ENCOUNTER — TELEPHONE (OUTPATIENT)
Dept: ONCOLOGY | Facility: CLINIC | Age: 48
End: 2023-02-20
Payer: COMMERCIAL

## 2023-02-20 DIAGNOSIS — D64.9 ANEMIA, UNSPECIFIED TYPE: Primary | ICD-10-CM

## 2023-02-20 LAB
HGB A MFR BLD ELPH: 98 % (ref 96.4–98.8)
HGB A2 MFR BLD ELPH: 2 % (ref 1.8–3.2)
HGB F MFR BLD ELPH: 0 % (ref 0–2)
HGB FRACT BLD-IMP: NORMAL
HGB S MFR BLD ELPH: 0 %

## 2023-02-20 NOTE — PROGRESS NOTES
"Orders entered based on Dr. Mendoza's staff message:  \"Reji or NP (if NP2u) 2-3 wk cbc retic\"    "

## 2023-02-20 NOTE — PROGRESS NOTES
Case Management Discharge Note      Final Note: Discharged to home on 2/19/23. REGGIE Shaffer RN, CCP         Selected Continued Care - Discharged on 2/19/2023 Admission date: 2/17/2023 - Discharge disposition: Home or Self Care    Destination    No services have been selected for the patient.              Durable Medical Equipment    No services have been selected for the patient.              Dialysis/Infusion    No services have been selected for the patient.              Home Medical Care    No services have been selected for the patient.              Therapy    No services have been selected for the patient.              Community Resources    No services have been selected for the patient.              Community & DME    No services have been selected for the patient.                  Transportation Services  Private: Car    Final Discharge Disposition Code: 01 - home or self-care

## 2023-02-20 NOTE — TELEPHONE ENCOUNTER
----- Message from Nikki Mccormick sent at 2/20/2023  7:28 AM EST -----    ----- Message -----  From: Yury Mendoza MD  Sent: 2/19/2023  10:32 AM EST  To: Oncology Nurses, Olga Lidia Mendoza or NP (if NP2u) 2-3 wk cbc retic

## 2023-02-21 LAB
LAB AP CASE REPORT: NORMAL
PATH REPORT.FINAL DX SPEC: NORMAL
PATH REPORT.GROSS SPEC: NORMAL

## 2023-02-22 LAB
BACTERIA SPEC AEROBE CULT: NORMAL
BACTERIA SPEC AEROBE CULT: NORMAL

## 2023-02-24 ENCOUNTER — TELEPHONE (OUTPATIENT)
Dept: SURGERY | Facility: CLINIC | Age: 48
End: 2023-02-24
Payer: COMMERCIAL

## 2023-02-24 RX ORDER — ONDANSETRON 4 MG/1
4 TABLET, FILM COATED ORAL DAILY PRN
Qty: 30 TABLET | Refills: 1 | OUTPATIENT
Start: 2023-02-24 | End: 2023-03-23

## 2023-02-24 NOTE — TELEPHONE ENCOUNTER
I can send him some Zofran through.  If the nausea persist despite Zofran he may need to come back to the emergency department to be evaluated.

## 2023-02-24 NOTE — TELEPHONE ENCOUNTER
Spoke with patient who reports he has been vomiting bile staring Wednesday 02/22/23, and he is not able to keep anything down.

## 2023-03-03 ENCOUNTER — OFFICE VISIT (OUTPATIENT)
Dept: ONCOLOGY | Facility: CLINIC | Age: 48
End: 2023-03-03
Payer: COMMERCIAL

## 2023-03-03 ENCOUNTER — LAB (OUTPATIENT)
Dept: LAB | Facility: HOSPITAL | Age: 48
End: 2023-03-03
Payer: COMMERCIAL

## 2023-03-03 VITALS
TEMPERATURE: 97.3 F | HEIGHT: 69 IN | BODY MASS INDEX: 32.22 KG/M2 | OXYGEN SATURATION: 96 % | SYSTOLIC BLOOD PRESSURE: 124 MMHG | DIASTOLIC BLOOD PRESSURE: 83 MMHG | RESPIRATION RATE: 18 BRPM | HEART RATE: 76 BPM | WEIGHT: 217.5 LBS

## 2023-03-03 DIAGNOSIS — D64.9 ANEMIA, UNSPECIFIED TYPE: Primary | ICD-10-CM

## 2023-03-03 DIAGNOSIS — D50.0 IRON DEFICIENCY ANEMIA DUE TO CHRONIC BLOOD LOSS: ICD-10-CM

## 2023-03-03 DIAGNOSIS — D64.9 ANEMIA, UNSPECIFIED TYPE: ICD-10-CM

## 2023-03-03 LAB
BASOPHILS # BLD AUTO: 0.1 10*3/MM3 (ref 0–0.2)
BASOPHILS NFR BLD AUTO: 1.3 % (ref 0–1.5)
DEPRECATED RDW RBC AUTO: 52.6 FL (ref 37–54)
EOSINOPHIL # BLD AUTO: 0.29 10*3/MM3 (ref 0–0.4)
EOSINOPHIL NFR BLD AUTO: 3.6 % (ref 0.3–6.2)
ERYTHROCYTE [DISTWIDTH] IN BLOOD BY AUTOMATED COUNT: 22.5 % (ref 12.3–15.4)
HCT VFR BLD AUTO: 35.4 % (ref 37.5–51)
HGB BLD-MCNC: 10.3 G/DL (ref 13–17.7)
HGB RETIC QN AUTO: 24.4 PG (ref 29.8–36.1)
IMM GRANULOCYTES # BLD AUTO: 0.32 10*3/MM3 (ref 0–0.05)
IMM GRANULOCYTES NFR BLD AUTO: 4 % (ref 0–0.5)
IMM RETICS NFR: 17.3 % (ref 3–15.8)
LYMPHOCYTES # BLD AUTO: 2.61 10*3/MM3 (ref 0.7–3.1)
LYMPHOCYTES NFR BLD AUTO: 32.8 % (ref 19.6–45.3)
MCH RBC QN AUTO: 20 PG (ref 26.6–33)
MCHC RBC AUTO-ENTMCNC: 29.1 G/DL (ref 31.5–35.7)
MCV RBC AUTO: 68.9 FL (ref 79–97)
MONOCYTES # BLD AUTO: 0.58 10*3/MM3 (ref 0.1–0.9)
MONOCYTES NFR BLD AUTO: 7.3 % (ref 5–12)
NEUTROPHILS NFR BLD AUTO: 4.06 10*3/MM3 (ref 1.7–7)
NEUTROPHILS NFR BLD AUTO: 51 % (ref 42.7–76)
NRBC BLD AUTO-RTO: 0 /100 WBC (ref 0–0.2)
PLATELET # BLD AUTO: 300 10*3/MM3 (ref 140–450)
PMV BLD AUTO: 10.2 FL (ref 6–12)
RBC # BLD AUTO: 5.14 10*6/MM3 (ref 4.14–5.8)
RETICS # AUTO: 0.08 10*6/MM3 (ref 0.02–0.13)
RETICS/RBC NFR AUTO: 1.5 % (ref 0.7–1.9)
WBC NRBC COR # BLD: 7.96 10*3/MM3 (ref 3.4–10.8)

## 2023-03-03 PROCEDURE — 36415 COLL VENOUS BLD VENIPUNCTURE: CPT

## 2023-03-03 PROCEDURE — 99214 OFFICE O/P EST MOD 30 MIN: CPT | Performed by: NURSE PRACTITIONER

## 2023-03-03 PROCEDURE — 85025 COMPLETE CBC W/AUTO DIFF WBC: CPT

## 2023-03-03 PROCEDURE — 85046 RETICYTE/HGB CONCENTRATE: CPT

## 2023-03-06 ENCOUNTER — OFFICE VISIT (OUTPATIENT)
Dept: SURGERY | Facility: CLINIC | Age: 48
End: 2023-03-06
Payer: COMMERCIAL

## 2023-03-06 VITALS — HEIGHT: 69 IN | BODY MASS INDEX: 31.64 KG/M2 | WEIGHT: 213.6 LBS

## 2023-03-06 DIAGNOSIS — K80.20 CALCULUS OF GALLBLADDER WITHOUT CHOLECYSTITIS WITHOUT OBSTRUCTION: Primary | ICD-10-CM

## 2023-03-06 PROCEDURE — 99024 POSTOP FOLLOW-UP VISIT: CPT | Performed by: SURGERY

## 2023-03-06 NOTE — PROGRESS NOTES
"[facility] CBC GROUP OUTPATIENT FOLLOW UP CLINIC VISIT    REASON FOR FOLLOW-UP:    Iron deficiency anemia    HISTORY OF PRESENT ILLNESS:  Emil Vo is a 47 y.o. male who returns today for follow up of the above issue.  He was seen during recent hospitalization by Dr. Mendoza for iron deficiency anemia.  Patient underwent laparoscopic cholecystectomy and cholangiogram on 2/18/2023 there was a possible nonobstructing filling defect.  Patient does have a history of iron deficiency prior to hospitalization.  His last EGD and colonoscopy was in 2017.  He has chronic bright red blood per rectum from bleeding hemorrhoids and was instructed to take Metamucil during hospitalization which has been beneficial.  He denies any bleeding over the past few days.  He received 2 doses of Ferrlecit during hospitalization for ferritin of 4 with iron saturation 3%.  Hemoglobin at discharge on 2/19/2023 was 9.7.    REVIEW OF SYSTEMS:  PAIN:  See Vital Signs below.  GENERAL:  No fevers, chills, night sweats.  Positive for fatigue.  SKIN:  No rashes or non-healing lesions.  HEME/LYMPH:  No abnormal bleeding.  No palpable lymphadenopathy.  EYES:  No vision changes or diplopia.  ENT:  No sore throat or difficulty swallowing.  RESPIRATORY:  No cough, shortness of breath, hemoptysis, or wheezing.  CARDIOVASCULAR:  No chest pain, palpitations, orthopnea, or dyspnea on exertion.  GASTROINTESTINAL: Healing well from surgery.  Nausea has improved.  GENITOURINARY:  No dysuria or hematuria.  MUSCULOSKELETAL:  No joint pain, swelling, or erythema.  NEUROLOGIC:  No dizziness, loss of consciousness, or seizures.  PSYCHIATRIC:  No depression, anxiety, or mood changes.    PHYSICAL EXAMINATION:  Vitals:    03/03/23 1316   BP: 124/83   Pulse: 76   Resp: 18   Temp: 97.3 °F (36.3 °C)   TempSrc: Temporal   SpO2: 96%   Weight: 98.7 kg (217 lb 8 oz)   Height: 175 cm (68.9\")  Comment: new ht   PainSc: 0-No pain       GENERAL:  Well-developed " well-nourished male; awake, alert and oriented, in no acute distress.  SKIN:  Warm and dry, without rashes, purpura, or petechiae.  HEAD:  Normocephalic, atraumatic.  Wearing a facemask.  EARS:  Hearing intact.  LYMPHATICS:  No cervical, supraclavicular, axillary lymphadenopathy.  CHEST:  Lungs are clear to auscultation bilaterally.  No wheezes, rales, or rhonchi.  HEART:  Regular rate; normal rhythm.  No murmurs, gallops or rubs.  ABDOMEN:  Soft, non-tender, non-distended.  Normal active bowel sounds.  No organomegaly.  EXTREMITIES:  No clubbing, cyanosis, or edema.  NEUROLOGICAL:  No focal neurologic deficits.    DIAGNOSTIC DATA:  Results for orders placed or performed in visit on 03/03/23   Retic With IRF & RET-He    Specimen: Blood   Result Value Ref Range    Immature Reticulocyte Fraction 17.3 (H) 3.0 - 15.8 %    Reticulocyte % 1.50 0.70 - 1.90 %    Reticulocyte Absolute 0.0771 0.0200 - 0.1300 10*6/mm3    Reticulocyte Hgb 24.4 (L) 29.8 - 36.1 pg   CBC Auto Differential    Specimen: Blood   Result Value Ref Range    WBC 7.96 3.40 - 10.80 10*3/mm3    RBC 5.14 4.14 - 5.80 10*6/mm3    Hemoglobin 10.3 (L) 13.0 - 17.7 g/dL    Hematocrit 35.4 (L) 37.5 - 51.0 %    MCV 68.9 (L) 79.0 - 97.0 fL    MCH 20.0 (L) 26.6 - 33.0 pg    MCHC 29.1 (L) 31.5 - 35.7 g/dL    RDW 22.5 (H) 12.3 - 15.4 %    RDW-SD 52.6 37.0 - 54.0 fl    MPV 10.2 6.0 - 12.0 fL    Platelets 300 140 - 450 10*3/mm3    Neutrophil % 51.0 42.7 - 76.0 %    Lymphocyte % 32.8 19.6 - 45.3 %    Monocyte % 7.3 5.0 - 12.0 %    Eosinophil % 3.6 0.3 - 6.2 %    Basophil % 1.3 0.0 - 1.5 %    Immature Grans % 4.0 (H) 0.0 - 0.5 %    Neutrophils, Absolute 4.06 1.70 - 7.00 10*3/mm3    Lymphocytes, Absolute 2.61 0.70 - 3.10 10*3/mm3    Monocytes, Absolute 0.58 0.10 - 0.90 10*3/mm3    Eosinophils, Absolute 0.29 0.00 - 0.40 10*3/mm3    Basophils, Absolute 0.10 0.00 - 0.20 10*3/mm3    Immature Grans, Absolute 0.32 (H) 0.00 - 0.05 10*3/mm3    nRBC 0.0 0.0 - 0.2 /100 WBC            ASSESSMENT:    This is a 47 y.o. male with:  *Microcytic, hypochromic anemia secondary to iron deficiency  • CBC 6 years previous showed a hemoglobin 10.9/MCV 77.8  • Current hemoglobin 9.3/MCV 63.2, normal white blood cell/platelet count; smear with anisocytosis, hypochromia, poikilocytosis, polychromasia  • Ferritin 4.2, iron sat 3%/TIBC 587, , haptoglobin 145, retic 0.9%  • B12, folate, hemoglobin electrophoresis-pending  • Patient reports chronic bright red blood per rectum from bleeding hemorrhoids; last EGD and colonoscopy 2017  • Initiated IV iron with Ferrlecit 2/18/2023 x2 doses, tolerated well.  • 2/19/2023 discharge from hospital with hemoglobin 9.7.  • Patient return to the office 3/3/2023 in follow-up.  His hemoglobin is slightly improved to 10.3 today however he remains microcytic and hyperchromic.  Given his severe constipation and bleeding hemorrhoids, we will proceed with additional IV iron with Venofer 300 mg x 2 doses.  I will have him return in 8 weeks for review by Dr. Mendoza with repeat ferritin and iron studies.     *Acute cholecystitis- status post laparoscopic cholecystectomy.  Patient is healing well.  Nausea is improving.       PLAN:  1. Venofer 300 mg x 2  2. Return to the office in 8 weeks for review by Dr. Mendoza with CBC, ferritin, iron profile  3. We will discuss referral back for EGD and colonoscopy once healed from cholecystectomy.  4. Patient does continue Metamucil for constipation and hemorrhoid management.    Records reviewed from recent hospitalization.  Case discussed with Dr. Mendoza today.

## 2023-03-06 NOTE — PROGRESS NOTES
Postop robotic cholecystectomy    Subjective:  Overall doing quite well, he did have a day or 2 of nausea after surgery but this is resolved and he is now essentially able to eat what ever he likes.    Objective:  BMI 31.6  Eyes: No icterus  Abdomen: Soft, benign, incisions healing well    Pathology reviewed with patient demonstrates typical chronic cholecystitis    Assessment and plan:  -Postop cholecystectomy, recovering well  -May return to work next week with no restrictions  -Follow-up here as needed    Cristi Au MD  General and Endoscopic Surgery  [facility]    [address] [place] [address], [address]  [place], [place], [address]  P: [phone]  F: [phone]

## 2023-03-07 PROBLEM — D50.0 IRON DEFICIENCY ANEMIA DUE TO CHRONIC BLOOD LOSS: Status: ACTIVE | Noted: 2023-03-07

## (undated) DEVICE — DRAPE,REIN 53X77,STERILE: Brand: MEDLINE

## (undated) DEVICE — TUBING, SUCTION, 1/4" X 10', STRAIGHT: Brand: MEDLINE

## (undated) DEVICE — OPEN END URETERAL CATHETER: Brand: URETERAL CATHETER

## (undated) DEVICE — BLADELESS OBTURATOR: Brand: WECK VISTA

## (undated) DEVICE — COLUMN DRAPE

## (undated) DEVICE — DRSNG WND BORDR/ADHS NONADHR/GZ LF 2X2IN STRL

## (undated) DEVICE — STPCK 3WY D201 DISCOFIX

## (undated) DEVICE — LOU LAP CHOLE: Brand: MEDLINE INDUSTRIES, INC.

## (undated) DEVICE — STRIP,CLOSURE,WOUND,MEDI-STRIP,1/2X4: Brand: MEDLINE

## (undated) DEVICE — LAPAROVUE VISIBILITY SYSTEM LAPAROSCOPIC SOLUTIONS: Brand: LAPAROVUE

## (undated) DEVICE — Device: Brand: DEFENDO AIR/WATER/SUCTION AND BIOPSY VALVE

## (undated) DEVICE — 3M™ STERI-STRIP™ COMPOUND BENZOIN TINCTURE 40 BAGS/CARTON 4 CARTONS/CASE C1544: Brand: 3M™ STERI-STRIP™

## (undated) DEVICE — PATIENT RETURN ELECTRODE, SINGLE-USE, CONTACT QUALITY MONITORING, ADULT, WITH 9FT CORD, FOR PATIENTS WEIGING OVER 33LBS. (15KG): Brand: MEGADYNE

## (undated) DEVICE — APPL CHLORAPREP HI/LITE 26ML ORNG

## (undated) DEVICE — TISSUE RETRIEVAL SYSTEM: Brand: INZII RETRIEVAL SYSTEM

## (undated) DEVICE — ARM DRAPE

## (undated) DEVICE — SOL ANTISTICK CAUTRY ELECTROLUBE LF

## (undated) DEVICE — MSK O2 NONREBRTHR W/VNT LF ADULT 7FT

## (undated) DEVICE — SUT MNCRYL PLS ANTIB UD 4/0 PS2 18IN

## (undated) DEVICE — CATH IV INSYTE AUTOGARD 14G 1 1/2IN ORNG

## (undated) DEVICE — UNDYED BRAIDED (POLYGLACTIN 910), SYNTHETIC ABSORBABLE SUTURE: Brand: COATED VICRYL

## (undated) DEVICE — EXTENSION SET, MALE LUER LOCK ADAPTER WITH RETRACTABLE COLLAR

## (undated) DEVICE — CANN NASL CO2 TRULINK W/O2 A/

## (undated) DEVICE — ANTIBACTERIAL UNDYED BRAIDED (POLYGLACTIN 910), SYNTHETIC ABSORBABLE SUTURE: Brand: COATED VICRYL

## (undated) DEVICE — THE TORRENT IRRIGATION SCOPE CONNECTOR IS USED WITH THE TORRENT IRRIGATION TUBING TO PROVIDE IRRIGATION FLUIDS SUCH AS STERILE WATER DURING GASTROINTESTINAL ENDOSCOPIC PROCEDURES WHEN USED IN CONJUNCTION WITH AN IRRIGATION PUMP (OR ELECTROSURGICAL UNIT).: Brand: TORRENT

## (undated) DEVICE — THE STERILE LIGHT HANDLE COVER IS USED WITH STERIS SURGICAL LIGHTING AND VISUALIZATION SYSTEMS.

## (undated) DEVICE — FRCP BX RADJAW4 NDL 2.8 240CM LG OG BX40

## (undated) DEVICE — CANNULA SEAL

## (undated) DEVICE — SYR LUERLOK 30CC

## (undated) DEVICE — DRP C/ARM 41X74IN

## (undated) DEVICE — ENDOPATH XCEL BLADELESS TROCARS WITH STABILITY SLEEVES: Brand: ENDOPATH XCEL

## (undated) DEVICE — LAPAROSCOPIC SMOKE FILTRATION SYSTEM: Brand: PALL LAPAROSHIELD® PLUS LAPAROSCOPIC SMOKE FILTRATION SYSTEM

## (undated) DEVICE — BITEBLOCK OMNI BLOC